# Patient Record
Sex: FEMALE | Race: WHITE | HISPANIC OR LATINO
[De-identification: names, ages, dates, MRNs, and addresses within clinical notes are randomized per-mention and may not be internally consistent; named-entity substitution may affect disease eponyms.]

---

## 2017-09-07 PROBLEM — Z00.00 ENCOUNTER FOR PREVENTIVE HEALTH EXAMINATION: Noted: 2017-09-07

## 2018-02-16 ENCOUNTER — RESULT REVIEW (OUTPATIENT)
Age: 38
End: 2018-02-16

## 2018-02-21 ENCOUNTER — OUTPATIENT (OUTPATIENT)
Dept: OUTPATIENT SERVICES | Facility: HOSPITAL | Age: 38
LOS: 1 days | End: 2018-02-21
Payer: COMMERCIAL

## 2018-02-21 ENCOUNTER — APPOINTMENT (OUTPATIENT)
Dept: MAMMOGRAPHY | Facility: CLINIC | Age: 38
End: 2018-02-21
Payer: COMMERCIAL

## 2018-02-21 DIAGNOSIS — Z00.8 ENCOUNTER FOR OTHER GENERAL EXAMINATION: ICD-10-CM

## 2018-02-21 PROBLEM — Z00.00 ENCOUNTER FOR PREVENTIVE HEALTH EXAMINATION: Status: ACTIVE | Noted: 2018-02-21

## 2018-02-21 PROCEDURE — 77063 BREAST TOMOSYNTHESIS BI: CPT | Mod: 26

## 2018-02-21 PROCEDURE — 77067 SCR MAMMO BI INCL CAD: CPT | Mod: 26

## 2018-02-21 PROCEDURE — 77067 SCR MAMMO BI INCL CAD: CPT

## 2018-02-21 PROCEDURE — 77063 BREAST TOMOSYNTHESIS BI: CPT

## 2019-10-20 ENCOUNTER — EMERGENCY (EMERGENCY)
Facility: HOSPITAL | Age: 39
LOS: 0 days | Discharge: HOME | End: 2019-10-21
Attending: EMERGENCY MEDICINE
Payer: COMMERCIAL

## 2019-10-20 VITALS
TEMPERATURE: 98 F | HEART RATE: 87 BPM | OXYGEN SATURATION: 100 % | RESPIRATION RATE: 18 BRPM | SYSTOLIC BLOOD PRESSURE: 111 MMHG | DIASTOLIC BLOOD PRESSURE: 71 MMHG

## 2019-10-20 VITALS — HEIGHT: 63 IN | WEIGHT: 199.96 LBS

## 2019-10-20 DIAGNOSIS — Z88.1 ALLERGY STATUS TO OTHER ANTIBIOTIC AGENTS STATUS: ICD-10-CM

## 2019-10-20 DIAGNOSIS — R07.0 PAIN IN THROAT: ICD-10-CM

## 2019-10-20 DIAGNOSIS — Z86.32 PERSONAL HISTORY OF GESTATIONAL DIABETES: ICD-10-CM

## 2019-10-20 PROCEDURE — 99282 EMERGENCY DEPT VISIT SF MDM: CPT

## 2019-10-20 PROCEDURE — 71045 X-RAY EXAM CHEST 1 VIEW: CPT

## 2019-10-20 PROCEDURE — 70360 X-RAY EXAM OF NECK: CPT

## 2019-10-20 PROCEDURE — 99284 EMERGENCY DEPT VISIT MOD MDM: CPT | Mod: 25

## 2019-10-20 NOTE — ED ADULT TRIAGE NOTE - CHIEF COMPLAINT QUOTE
Pt complains of eating fish this afternoon and feels as if she has a fish bone in her throat. Pt in no acute distress.

## 2019-10-21 PROCEDURE — 71045 X-RAY EXAM CHEST 1 VIEW: CPT | Mod: 26

## 2019-10-21 PROCEDURE — 70360 X-RAY EXAM OF NECK: CPT | Mod: 26

## 2019-10-21 NOTE — ED ADULT NURSE NOTE - NSIMPLEMENTINTERV_GEN_ALL_ED
Implemented All Universal Safety Interventions:  [place] to call system. Call bell, personal items and telephone within reach. Instruct patient to call for assistance. Room bathroom lighting operational. Non-slip footwear when patient is off stretcher. Physically safe environment: no spills, clutter or unnecessary equipment. Stretcher in lowest position, wheels locked, appropriate side rails in place.

## 2019-10-21 NOTE — ED PROVIDER NOTE - OBJECTIVE STATEMENT
39 year old female with PMH of gestational diabetes, lipoma who presents with cc of feeling of pain, and sensation of foreign body in the throat. no cp, sob or palpitation. no abdominal pain. No vomiting or nausea. No difficulty breathing. No sob. No visual or focal neurological complaints. No melena or hematochezia.

## 2019-10-21 NOTE — ED PROVIDER NOTE - PATIENT PORTAL LINK FT
You can access the FollowMyHealth Patient Portal offered by [facility] by registering at the following website: [email or web address]. By joining [employer]’s FollowMyHealth portal, you will also be able to view your health information using other applications (apps) compatible with our system.

## 2019-10-21 NOTE — ED PROVIDER NOTE - CLINICAL SUMMARY MEDICAL DECISION MAKING FREE TEXT BOX
No difficulty holding secretions, able to eat and drink in the ED, no respiratory distress, sensation of pain, or foreign body in the throat. X-ray shows no evidence of FB on my evaluation. Patient instructed to follow up with GI outpatient and to return to us immediately if any health concerns.

## 2019-10-21 NOTE — ED ADULT NURSE NOTE - OBJECTIVE STATEMENT
pt c/o fish bone stuck in throat during lunch around 2pm. pt denies SOB, difficulty breathing. pt able to control secretions. pt able to eat dinner & drink this evening but states it still feels like the bone is there.

## 2019-10-21 NOTE — ED PROVIDER NOTE - EYES, MLM
Clear bilaterally, pupils equal, round and reactive to light. EOMI. Visual fields intact x 4 fields.

## 2020-03-31 ENCOUNTER — INPATIENT (INPATIENT)
Facility: HOSPITAL | Age: 40
LOS: 4 days | Discharge: HOME | DRG: 133 | End: 2020-04-05
Attending: FAMILY MEDICINE | Admitting: HOSPITALIST
Payer: COMMERCIAL

## 2020-03-31 VITALS — HEIGHT: 62 IN | WEIGHT: 199.96 LBS | OXYGEN SATURATION: 83 % | HEART RATE: 105 BPM

## 2020-03-31 DIAGNOSIS — J96.01 ACUTE RESPIRATORY FAILURE WITH HYPOXIA: ICD-10-CM

## 2020-03-31 DIAGNOSIS — E11.9 TYPE 2 DIABETES MELLITUS WITHOUT COMPLICATIONS: ICD-10-CM

## 2020-03-31 DIAGNOSIS — J18.9 PNEUMONIA, UNSPECIFIED ORGANISM: ICD-10-CM

## 2020-03-31 DIAGNOSIS — U07.1 COVID-19: ICD-10-CM

## 2020-03-31 DIAGNOSIS — Z98.891 HISTORY OF UTERINE SCAR FROM PREVIOUS SURGERY: Chronic | ICD-10-CM

## 2020-03-31 DIAGNOSIS — Z91.19 PATIENT'S NONCOMPLIANCE WITH OTHER MEDICAL TREATMENT AND REGIMEN: ICD-10-CM

## 2020-03-31 DIAGNOSIS — J12.89 OTHER VIRAL PNEUMONIA: ICD-10-CM

## 2020-03-31 DIAGNOSIS — E11.65 TYPE 2 DIABETES MELLITUS WITH HYPERGLYCEMIA: ICD-10-CM

## 2020-03-31 LAB
ADD ON TEST-SPECIMEN IN LAB: SIGNIFICANT CHANGE UP
ALBUMIN SERPL ELPH-MCNC: 2.9 G/DL — LOW (ref 3.3–5)
ALP SERPL-CCNC: 92 U/L — SIGNIFICANT CHANGE UP (ref 40–120)
ALT FLD-CCNC: 27 U/L — SIGNIFICANT CHANGE UP (ref 12–78)
ANION GAP SERPL CALC-SCNC: 9 MMOL/L — SIGNIFICANT CHANGE UP (ref 5–17)
AST SERPL-CCNC: 19 U/L — SIGNIFICANT CHANGE UP (ref 15–37)
BASOPHILS # BLD AUTO: 0 K/UL — SIGNIFICANT CHANGE UP (ref 0–0.2)
BASOPHILS NFR BLD AUTO: 0 % — SIGNIFICANT CHANGE UP (ref 0–2)
BILIRUB SERPL-MCNC: 0.4 MG/DL — SIGNIFICANT CHANGE UP (ref 0.2–1.2)
BUN SERPL-MCNC: 6 MG/DL — LOW (ref 7–23)
CALCIUM SERPL-MCNC: 8.9 MG/DL — SIGNIFICANT CHANGE UP (ref 8.5–10.1)
CHLORIDE SERPL-SCNC: 103 MMOL/L — SIGNIFICANT CHANGE UP (ref 96–108)
CO2 SERPL-SCNC: 25 MMOL/L — SIGNIFICANT CHANGE UP (ref 22–31)
CREAT SERPL-MCNC: 0.62 MG/DL — SIGNIFICANT CHANGE UP (ref 0.5–1.3)
EOSINOPHIL # BLD AUTO: 0 K/UL — SIGNIFICANT CHANGE UP (ref 0–0.5)
EOSINOPHIL NFR BLD AUTO: 0 % — SIGNIFICANT CHANGE UP (ref 0–6)
GLUCOSE SERPL-MCNC: 303 MG/DL — HIGH (ref 70–99)
HCG SERPL-ACNC: <1 MIU/ML — SIGNIFICANT CHANGE UP
HCT VFR BLD CALC: 40.1 % — SIGNIFICANT CHANGE UP (ref 34.5–45)
HGB BLD-MCNC: 13.3 G/DL — SIGNIFICANT CHANGE UP (ref 11.5–15.5)
LACTATE SERPL-SCNC: 1.4 MMOL/L — SIGNIFICANT CHANGE UP (ref 0.7–2)
LYMPHOCYTES # BLD AUTO: 1.43 K/UL — SIGNIFICANT CHANGE UP (ref 1–3.3)
LYMPHOCYTES # BLD AUTO: 14 % — SIGNIFICANT CHANGE UP (ref 13–44)
MCHC RBC-ENTMCNC: 27.7 PG — SIGNIFICANT CHANGE UP (ref 27–34)
MCHC RBC-ENTMCNC: 33.2 GM/DL — SIGNIFICANT CHANGE UP (ref 32–36)
MCV RBC AUTO: 83.5 FL — SIGNIFICANT CHANGE UP (ref 80–100)
MONOCYTES # BLD AUTO: 0.92 K/UL — HIGH (ref 0–0.9)
MONOCYTES NFR BLD AUTO: 9 % — SIGNIFICANT CHANGE UP (ref 2–14)
NEUTROPHILS # BLD AUTO: 7.56 K/UL — HIGH (ref 1.8–7.4)
NEUTROPHILS NFR BLD AUTO: 74 % — SIGNIFICANT CHANGE UP (ref 43–77)
NRBC # BLD: SIGNIFICANT CHANGE UP /100 WBCS (ref 0–0)
PLATELET # BLD AUTO: 345 K/UL — SIGNIFICANT CHANGE UP (ref 150–400)
POTASSIUM SERPL-MCNC: 3.6 MMOL/L — SIGNIFICANT CHANGE UP (ref 3.5–5.3)
POTASSIUM SERPL-SCNC: 3.6 MMOL/L — SIGNIFICANT CHANGE UP (ref 3.5–5.3)
PROT SERPL-MCNC: 7.9 GM/DL — SIGNIFICANT CHANGE UP (ref 6–8.3)
RBC # BLD: 4.8 M/UL — SIGNIFICANT CHANGE UP (ref 3.8–5.2)
RBC # FLD: 12.3 % — SIGNIFICANT CHANGE UP (ref 10.3–14.5)
SODIUM SERPL-SCNC: 137 MMOL/L — SIGNIFICANT CHANGE UP (ref 135–145)
WBC # BLD: 10.22 K/UL — SIGNIFICANT CHANGE UP (ref 3.8–10.5)
WBC # FLD AUTO: 10.22 K/UL — SIGNIFICANT CHANGE UP (ref 3.8–10.5)

## 2020-03-31 PROCEDURE — 85027 COMPLETE CBC AUTOMATED: CPT

## 2020-03-31 PROCEDURE — 93005 ELECTROCARDIOGRAM TRACING: CPT

## 2020-03-31 PROCEDURE — 36415 COLL VENOUS BLD VENIPUNCTURE: CPT

## 2020-03-31 PROCEDURE — 80053 COMPREHEN METABOLIC PANEL: CPT

## 2020-03-31 PROCEDURE — 83615 LACTATE (LD) (LDH) ENZYME: CPT

## 2020-03-31 PROCEDURE — 83036 HEMOGLOBIN GLYCOSYLATED A1C: CPT

## 2020-03-31 PROCEDURE — 93010 ELECTROCARDIOGRAM REPORT: CPT

## 2020-03-31 PROCEDURE — 80048 BASIC METABOLIC PNL TOTAL CA: CPT

## 2020-03-31 PROCEDURE — 71045 X-RAY EXAM CHEST 1 VIEW: CPT | Mod: 26

## 2020-03-31 PROCEDURE — 82962 GLUCOSE BLOOD TEST: CPT

## 2020-03-31 PROCEDURE — 85025 COMPLETE CBC W/AUTO DIFF WBC: CPT

## 2020-03-31 PROCEDURE — 86140 C-REACTIVE PROTEIN: CPT

## 2020-03-31 PROCEDURE — 82803 BLOOD GASES ANY COMBINATION: CPT

## 2020-03-31 PROCEDURE — 80061 LIPID PANEL: CPT

## 2020-03-31 PROCEDURE — 99223 1ST HOSP IP/OBS HIGH 75: CPT

## 2020-03-31 PROCEDURE — 85379 FIBRIN DEGRADATION QUANT: CPT

## 2020-03-31 PROCEDURE — 82728 ASSAY OF FERRITIN: CPT

## 2020-03-31 PROCEDURE — 36600 WITHDRAWAL OF ARTERIAL BLOOD: CPT

## 2020-03-31 PROCEDURE — 84145 PROCALCITONIN (PCT): CPT

## 2020-03-31 RX ORDER — ACETAMINOPHEN 500 MG
1000 TABLET ORAL ONCE
Refills: 0 | Status: COMPLETED | OUTPATIENT
Start: 2020-03-31 | End: 2020-03-31

## 2020-03-31 RX ORDER — INSULIN LISPRO 100/ML
VIAL (ML) SUBCUTANEOUS
Refills: 0 | Status: DISCONTINUED | OUTPATIENT
Start: 2020-03-31 | End: 2020-03-31

## 2020-03-31 RX ORDER — DEXTROSE 50 % IN WATER 50 %
15 SYRINGE (ML) INTRAVENOUS ONCE
Refills: 0 | Status: DISCONTINUED | OUTPATIENT
Start: 2020-03-31 | End: 2020-04-01

## 2020-03-31 RX ORDER — CEFTRIAXONE 500 MG/1
1000 INJECTION, POWDER, FOR SOLUTION INTRAMUSCULAR; INTRAVENOUS EVERY 24 HOURS
Refills: 0 | Status: DISCONTINUED | OUTPATIENT
Start: 2020-03-31 | End: 2020-04-05

## 2020-03-31 RX ORDER — DEXTROSE 50 % IN WATER 50 %
12.5 SYRINGE (ML) INTRAVENOUS ONCE
Refills: 0 | Status: DISCONTINUED | OUTPATIENT
Start: 2020-03-31 | End: 2020-04-03

## 2020-03-31 RX ORDER — ACETAMINOPHEN 500 MG
650 TABLET ORAL EVERY 6 HOURS
Refills: 0 | Status: DISCONTINUED | OUTPATIENT
Start: 2020-03-31 | End: 2020-04-05

## 2020-03-31 RX ORDER — GLUCAGON INJECTION, SOLUTION 0.5 MG/.1ML
1 INJECTION, SOLUTION SUBCUTANEOUS ONCE
Refills: 0 | Status: DISCONTINUED | OUTPATIENT
Start: 2020-03-31 | End: 2020-04-03

## 2020-03-31 RX ORDER — DEXTROSE 50 % IN WATER 50 %
25 SYRINGE (ML) INTRAVENOUS ONCE
Refills: 0 | Status: DISCONTINUED | OUTPATIENT
Start: 2020-03-31 | End: 2020-04-01

## 2020-03-31 RX ORDER — INSULIN LISPRO 100/ML
VIAL (ML) SUBCUTANEOUS AT BEDTIME
Refills: 0 | Status: DISCONTINUED | OUTPATIENT
Start: 2020-03-31 | End: 2020-03-31

## 2020-03-31 RX ORDER — AZITHROMYCIN 500 MG/1
500 TABLET, FILM COATED ORAL ONCE
Refills: 0 | Status: COMPLETED | OUTPATIENT
Start: 2020-03-31 | End: 2020-03-31

## 2020-03-31 RX ORDER — HYDROXYCHLOROQUINE SULFATE 200 MG
400 TABLET ORAL EVERY 12 HOURS
Refills: 0 | Status: COMPLETED | OUTPATIENT
Start: 2020-03-31 | End: 2020-04-01

## 2020-03-31 RX ORDER — HYDROXYCHLOROQUINE SULFATE 200 MG
TABLET ORAL
Refills: 0 | Status: COMPLETED | OUTPATIENT
Start: 2020-03-31 | End: 2020-04-05

## 2020-03-31 RX ORDER — INSULIN GLARGINE 100 [IU]/ML
10 INJECTION, SOLUTION SUBCUTANEOUS EVERY MORNING
Refills: 0 | Status: DISCONTINUED | OUTPATIENT
Start: 2020-03-31 | End: 2020-04-01

## 2020-03-31 RX ORDER — ENOXAPARIN SODIUM 100 MG/ML
40 INJECTION SUBCUTANEOUS DAILY
Refills: 0 | Status: DISCONTINUED | OUTPATIENT
Start: 2020-03-31 | End: 2020-04-05

## 2020-03-31 RX ORDER — ALBUTEROL 90 UG/1
2 AEROSOL, METERED ORAL EVERY 6 HOURS
Refills: 0 | Status: DISCONTINUED | OUTPATIENT
Start: 2020-03-31 | End: 2020-04-05

## 2020-03-31 RX ORDER — ALBUTEROL 90 UG/1
2 AEROSOL, METERED ORAL ONCE
Refills: 0 | Status: COMPLETED | OUTPATIENT
Start: 2020-03-31 | End: 2020-03-31

## 2020-03-31 RX ORDER — CEFTRIAXONE 500 MG/1
1 INJECTION, POWDER, FOR SOLUTION INTRAMUSCULAR; INTRAVENOUS EVERY 24 HOURS
Refills: 0 | Status: DISCONTINUED | OUTPATIENT
Start: 2020-03-31 | End: 2020-03-31

## 2020-03-31 RX ORDER — HYDROXYCHLOROQUINE SULFATE 200 MG
200 TABLET ORAL EVERY 12 HOURS
Refills: 0 | Status: COMPLETED | OUTPATIENT
Start: 2020-04-01 | End: 2020-04-05

## 2020-03-31 RX ORDER — SODIUM CHLORIDE 9 MG/ML
1000 INJECTION, SOLUTION INTRAVENOUS
Refills: 0 | Status: DISCONTINUED | OUTPATIENT
Start: 2020-03-31 | End: 2020-04-01

## 2020-03-31 RX ORDER — ONDANSETRON 8 MG/1
4 TABLET, FILM COATED ORAL EVERY 6 HOURS
Refills: 0 | Status: DISCONTINUED | OUTPATIENT
Start: 2020-03-31 | End: 2020-04-05

## 2020-03-31 RX ORDER — SODIUM CHLORIDE 9 MG/ML
1000 INJECTION INTRAMUSCULAR; INTRAVENOUS; SUBCUTANEOUS ONCE
Refills: 0 | Status: COMPLETED | OUTPATIENT
Start: 2020-03-31 | End: 2020-03-31

## 2020-03-31 RX ADMIN — SODIUM CHLORIDE 1000 MILLILITER(S): 9 INJECTION INTRAMUSCULAR; INTRAVENOUS; SUBCUTANEOUS at 02:51

## 2020-03-31 RX ADMIN — ENOXAPARIN SODIUM 40 MILLIGRAM(S): 100 INJECTION SUBCUTANEOUS at 18:31

## 2020-03-31 RX ADMIN — AZITHROMYCIN 500 MILLIGRAM(S): 500 TABLET, FILM COATED ORAL at 03:49

## 2020-03-31 RX ADMIN — ALBUTEROL 2 PUFF(S): 90 AEROSOL, METERED ORAL at 01:59

## 2020-03-31 RX ADMIN — Medication 45 MILLIGRAM(S): at 18:31

## 2020-03-31 RX ADMIN — Medication 1200 MILLIGRAM(S): at 02:21

## 2020-03-31 RX ADMIN — Medication 1000 MILLIGRAM(S): at 01:40

## 2020-03-31 RX ADMIN — Medication 400 MILLIGRAM(S): at 18:32

## 2020-03-31 RX ADMIN — AZITHROMYCIN 255 MILLIGRAM(S): 500 TABLET, FILM COATED ORAL at 02:49

## 2020-03-31 RX ADMIN — SODIUM CHLORIDE 1000 MILLILITER(S): 9 INJECTION INTRAMUSCULAR; INTRAVENOUS; SUBCUTANEOUS at 01:40

## 2020-03-31 NOTE — H&P ADULT - PROBLEM SELECTOR PLAN 2
Hold oral hypoglycemic agent  Sliding scale  Hgb A1C in AM  Lipid profile in AM - Hold oral hypoglycemic agent  - Sliding scale  - Hgb A1C in AM  Lipid profile in AM

## 2020-03-31 NOTE — ED ADULT NURSE NOTE - CHPI ED NUR SYMPTOMS NEG
no wheezing/no chest pain/no chills/no cough/no diaphoresis/no edema/no body aches/no headache/no hemoptysis

## 2020-03-31 NOTE — ED ADULT NURSE REASSESSMENT NOTE - NS ED NURSE REASSESS COMMENT FT1
Patient received from previous nurse. Pt is A&Ox4, VSS on nonbreather. Pt is resting comfortably in their bed at this time, denies any pain or discomfort at this time. Safety and comfort measures in place. Hourly rounding will be done on my time. Will continue to monitor.

## 2020-03-31 NOTE — H&P ADULT - HISTORY OF PRESENT ILLNESS
41 yo F with a hx of Type 2 DM  on Metformin presents with 2 weeks of generalized fatigue, body aches, 4 days of dry cough, 2 days of shortness of breath and fever.  Patient came to ED for difficulty breathing after coughing and while trying to go to the bathroom in the middle of the night.  Patient took Tylenol with minimal relief  In ED on O2 NRM;  remains SOB with minimal activities. Received 1 dose of Azithromycin IV 41 yo F with a hx of Type 2 DM  on Metformin presents with 2 weeks of generalized fatigue, body aches, 4 days of dry cough, 2 days of shortness of breath and fever.  Patient came to ED for difficulty breathing after coughing and while trying to go to the bathroom in the middle of the night.  Patient took Tylenol with minimal relief  In ED on O2 NRM; remains SOB with minimal activities. Received 1 dose of Azithromycin IV Patient is a 41 y/o/f w/ a hx of Type 2 DM admitted in the hospital with cc of 2 weeks of generalized fatigue, body aches, 4 days of dry cough, 2 days of shortness of breath and fever.  Patient came to ED for difficulty breathing after coughing and while trying to go to the bathroom in the middle of the night.  Patient took Tylenol with minimal relief    In ED on O2 NRM; remains SOB with minimal activities. Received 1 dose of Azithromycin IV    Started on IV solumedrol / plaquinil /  Patient currently on NRB with sats

## 2020-03-31 NOTE — ED ADULT NURSE NOTE - OBJECTIVE STATEMENT
Pt comes to the ED complaining of fever and shortness of breath x 1 week. Pt states that tonight her breathing became very fast and she felt like she could not breathe.

## 2020-03-31 NOTE — ED PROVIDER NOTE - CARE PLAN
Principal Discharge DX:	Pneumonia, unspecified organism  Secondary Diagnosis:	Dyspnea, unspecified type  Secondary Diagnosis:	Hypoxia

## 2020-03-31 NOTE — H&P ADULT - PROBLEM SELECTOR PLAN 1
R/O COVID  O2 100% NRM  Admit to tele  EKG  Plaquenil taper  Solumedrol 45 mg IV BID - R/O COVID  - O2 100% NRM  - continues O2 monitoring  - EKG  - initiated Plaquenil   - Solumedrol 45 mg IV BID 1/3 days  - closely monitor patient's respiratory status

## 2020-03-31 NOTE — ED ADULT NURSE REASSESSMENT NOTE - NS ED NURSE REASSESS COMMENT FT1
Pt A&O x 4, recvd pt from night RN in bed with NRB in place. Pt given breakfast and denies pain, or SOB at this time. will monitor.

## 2020-03-31 NOTE — H&P ADULT - NSHPPHYSICALEXAM_GEN_ALL_CORE
PHYSICAL EXAM:    T(C): 36.7 (31 Mar 2020 08:16), Max: 37.4 (31 Mar 2020 07:06)  T(F): 98.1 (31 Mar 2020 08:16), Max: 99.3 (31 Mar 2020 07:06)  HR: 82 (31 Mar 2020 10:59) (68 - 105)  BP: 117/79 (31 Mar 2020 10:59) (105/68 - 117/79)  BP(mean): 81 (31 Mar 2020 08:16) (81 - 81)  RR: 25 (31 Mar 2020 10:59) (25 - 33)  SpO2: 97% (31 Mar 2020 10:59) (83% - 99%)    Constitutional: NAD, well-groomed, well-developed  Neuro: Alert and oriented x 3 Gait steady Speech clear No focal deficits  Neck: No JVD No bruit  Respiratory: CTAB; decreased breath sounds bilaterally  Cardiovascular: S1 and S2, RRR,   Gastrointestinal: BS+, soft, NT/ND  Extremities: No clubbing cyanosis or edema No varicosities  Vascular: 2+ peripheral pulses  Psychiatric: Normal mood, normal affect  Musculoskeletal: 5/5 strength b/l upper and lower extremities

## 2020-03-31 NOTE — H&P ADULT - NSHPLABSRESULTS_GEN_ALL_CORE
13.3   10.22 )-----------( 345      ( 31 Mar 2020 01:25 )             40.1       03-31 @ 01:25    137 | 103 | 6  /8.9 | -- | --  _______________________/  3.6 | 25 | 0.62 \303                          \      < from: Xray Chest 1 View-PORTABLE IMMEDIATE (03.31.20 @ 02:28) >    PROCEDURE DATE:  03/31/2020          INTERPRETATION:  CLINICAL INDICATION: Shortness of breath, fever, dry cough.    TECHNIQUE: AP view of the chest.    COMPARISON: 10/21/2019.     FINDINGS: Low lung volume. Linear/patchy opacities in the left > right lung base. Possible blunting of the left costophrenic angle. No pneumothorax. No pulmonary vascular congestion. The cardiomediastinal silhouette is difficult to assess. Degenerative changes in the spine.     IMPRESSION:    Linear/patchy opacities in the left > right lung base, which may be due to atelectasis although pneumonia (bacterial or viral, i.e., COVID-19) cannot be excluded. Recommend correlation with history and clinical testing for further evaluation.      ADRIANNA WOOD   This document has been electronically signed. Mar 31 2020  2:58AM        < end of copied text >    3/31/2020 EKG NSR- CBC Full  -  ( 31 Mar 2020 01:25 )  WBC Count : 10.22 K/uL  RBC Count : 4.80 M/uL  Hemoglobin : 13.3 g/dL  Hematocrit : 40.1 %  Platelet Count - Automated : 345 K/uL  Mean Cell Volume : 83.5 fl  Mean Cell Hemoglobin : 27.7 pg  Mean Cell Hemoglobin Concentration : 33.2 gm/dL  Auto Neutrophil # : 7.56 K/uL  Auto Lymphocyte # : 1.43 K/uL  Auto Monocyte # : 0.92 K/uL  Auto Eosinophil # : 0.00 K/uL  Auto Basophil # : 0.00 K/uL  Auto Neutrophil % : 74.0 %  Auto Lymphocyte % : 14.0 %  Auto Monocyte % : 9.0 %  Auto Eosinophil % : 0.0 %  Auto Basophil % : 0.0 %        03-31    137  |  103  |  6<L>  ----------------------------<  303<H>  3.6   |  25  |  0.62    Ca    8.9      31 Mar 2020 01:25    TPro  7.9  /  Alb  2.9<L>  /  TBili  0.4  /  DBili  x   /  AST  19  /  ALT  27  /  AlkPhos  92  03-31        < from: Xray Chest 1 View-PORTABLE IMMEDIATE (03.31.20 @ 02:28) >    PROCEDURE DATE:  03/31/2020          INTERPRETATION:  CLINICAL INDICATION: Shortness of breath, fever, dry cough.    TECHNIQUE: AP view of the chest.    COMPARISON: 10/21/2019.     FINDINGS: Low lung volume. Linear/patchy opacities in the left > right lung base. Possible blunting of the left costophrenic angle. No pneumothorax. No pulmonary vascular congestion. The cardiomediastinal silhouette is difficult to assess. Degenerative changes in the spine.     IMPRESSION:    Linear/patchy opacities in the left > right lung base, which may be due to atelectasis although pneumonia (bacterial or viral, i.e., COVID-19) cannot be excluded. Recommend correlation with history and clinical testing for further evaluation.      ADRIANNA WOOD   This document has been electronically signed. Mar 31 2020  2:58AM        < end of copied text >    3/31/2020 EKG NSR-

## 2020-03-31 NOTE — H&P ADULT - NSHPREVIEWOFSYSTEMS_GEN_ALL_CORE
REVIEW OF SYSTEMS:    CONSTITUTIONAL: + generalized fatigue; body aches   EYES/ENT: No visual changes;  No vertigo or throat pain   NECK: No pain or stiffness  RESPIRATORY: + cough and SOB  CARDIOVASCULAR: No chest pain or palpitations  GASTROINTESTINAL: No abdominal or epigastric pain. No nausea, vomiting, or hematemesis; No diarrhea or constipation. No melena or hematochezia.  GENITOURINARY: No dysuria, frequency or hematuria. LMP: last week  NEUROLOGICAL: No numbness or weakness  SKIN: No itching, burning, rashes, or lesions   All other review of systems is negative unless indicated above. REVIEW OF SYSTEMS:  CONSTITUTIONAL: + generalized fatigue; body aches   EYES/ENT: No visual changes;  No vertigo or throat pain   NECK: No pain or stiffness  RESPIRATORY: + cough and SOB  CARDIOVASCULAR: No chest pain or palpitations  GASTROINTESTINAL: No abdominal or epigastric pain. No nausea, vomiting, or hematemesis; No diarrhea or constipation. No melena or hematochezia.  GENITOURINARY: No dysuria, frequency or hematuria. LMP: last week  NEUROLOGICAL: No numbness or weakness  SKIN: No itching, burning, rashes, or lesions   All other review of systems is negative unless indicated above.

## 2020-03-31 NOTE — ED ADULT NURSE NOTE - ISOLATION TYPE:
Airborne+Contact precautions/Droplet+Contact precautions/Contact precautions.../Droplet precautions...

## 2020-03-31 NOTE — ED ADULT NURSE NOTE - NSIMPLEMENTINTERV_GEN_ALL_ED
Implemented All Universal Safety Interventions:  [place] to call system. Call bell, personal items and telephone within reach. Instruct patient to call for assistance. Room bathroom lighting operational. Non-slip footwear when patient is off stretcher. Physically safe environment: no spills, clutter or unnecessary equipment. Stretcher in lowest position, wheels locked, appropriate side rails in place.

## 2020-03-31 NOTE — H&P ADULT - ASSESSMENT
41 yo F with a hx of Type 2 DM  on Metformin presents with 2 weeks of generalized fatigue, body aches, 4 days of dry cough, 2 days of shortness of breath and fever.  Patient came to ED for difficulty breathing after coughing and while trying to go to the bathroom in the middle of the night.  Patient took Tylenol with minimal relief  In ED on O2 NRM; remains SOB with minimal activities. Received 1 dose of Azithromycin IV     IMPROVE VTE Individual Risk Assessment          RISK                                                          Points  [  ] Previous VTE                                                3  [  ] Thrombophilia                                             2  [  ] Lower limb paralysis                                   2        (unable to hold up >15 seconds)    [  ] Current Cancer                                             2         (within 6 months)  [ x ] Immobilization > 24 hrs                              1  [  ] ICU/CCU stay > 24 hours                             1  [  ] Age > 60                                                         1    IMPROVE VTE Score: 1

## 2020-03-31 NOTE — ED PROVIDER NOTE - CONSTITUTIONAL, MLM
normal... Well appearing, awake, alert, oriented to person, place, time/situation ; anxious; conversational dyspnea.

## 2020-03-31 NOTE — ED ADULT TRIAGE NOTE - ISOLATION TYPE:
Airborne+Contact precautions/Droplet+Contact precautions/Droplet precautions.../Contact precautions...

## 2020-03-31 NOTE — ED ADULT TRIAGE NOTE - CHIEF COMPLAINT QUOTE
Pt presents to er with complaints of SOB and fevers for past week, denies sick contacts, respirations labored with 83% pulse-ox RA at this time, hr-105

## 2020-03-31 NOTE — ED PROVIDER NOTE - OBJECTIVE STATEMENT
Pt. is a 41 yo F with a hx of Type 2 DM presents with 2 weeks of generalized fatigue, body aches, 4 days of dry cough, 2 days of shortness of breath and fever.  Patient came to ED for difficulty breathing after coughing and while trying to go to the bathroom in the middle of the night.  Patient took tylenol yesterday for fever with minimal relief.  No other meds taken prior to arrival.

## 2020-04-01 DIAGNOSIS — R79.89 OTHER SPECIFIED ABNORMAL FINDINGS OF BLOOD CHEMISTRY: ICD-10-CM

## 2020-04-01 LAB
ALBUMIN SERPL ELPH-MCNC: 2.4 G/DL — LOW (ref 3.3–5)
ALP SERPL-CCNC: 92 U/L — SIGNIFICANT CHANGE UP (ref 40–120)
ALT FLD-CCNC: 28 U/L — SIGNIFICANT CHANGE UP (ref 12–78)
ANION GAP SERPL CALC-SCNC: 11 MMOL/L — SIGNIFICANT CHANGE UP (ref 5–17)
AST SERPL-CCNC: 18 U/L — SIGNIFICANT CHANGE UP (ref 15–37)
BASE EXCESS BLDA CALC-SCNC: -3.7 MMOL/L — LOW (ref -2–2)
BASOPHILS # BLD AUTO: 0 K/UL — SIGNIFICANT CHANGE UP (ref 0–0.2)
BASOPHILS NFR BLD AUTO: 0 % — SIGNIFICANT CHANGE UP (ref 0–2)
BILIRUB SERPL-MCNC: 0.4 MG/DL — SIGNIFICANT CHANGE UP (ref 0.2–1.2)
BUN SERPL-MCNC: 12 MG/DL — SIGNIFICANT CHANGE UP (ref 7–23)
CALCIUM SERPL-MCNC: 8.6 MG/DL — SIGNIFICANT CHANGE UP (ref 8.5–10.1)
CHLORIDE SERPL-SCNC: 105 MMOL/L — SIGNIFICANT CHANGE UP (ref 96–108)
CHOLEST SERPL-MCNC: 116 MG/DL — SIGNIFICANT CHANGE UP (ref 10–199)
CO2 SERPL-SCNC: 21 MMOL/L — LOW (ref 22–31)
CREAT SERPL-MCNC: 0.49 MG/DL — LOW (ref 0.5–1.3)
D DIMER BLD IA.RAPID-MCNC: 9347 NG/ML DDU — HIGH
EOSINOPHIL # BLD AUTO: 0 K/UL — SIGNIFICANT CHANGE UP (ref 0–0.5)
EOSINOPHIL NFR BLD AUTO: 0 % — SIGNIFICANT CHANGE UP (ref 0–6)
FERRITIN SERPL-MCNC: 422 NG/ML — HIGH (ref 15–150)
GAS PNL BLDA: SIGNIFICANT CHANGE UP
GLUCOSE SERPL-MCNC: 314 MG/DL — HIGH (ref 70–99)
HBA1C BLD-MCNC: 10.5 % — HIGH (ref 4–5.6)
HCO3 BLDA-SCNC: 20 MMOL/L — LOW (ref 21–29)
HCT VFR BLD CALC: 40.5 % — SIGNIFICANT CHANGE UP (ref 34.5–45)
HDLC SERPL-MCNC: 31 MG/DL — LOW
HGB BLD-MCNC: 13.3 G/DL — SIGNIFICANT CHANGE UP (ref 11.5–15.5)
LDH SERPL L TO P-CCNC: 274 U/L — HIGH (ref 84–241)
LIPID PNL WITH DIRECT LDL SERPL: 59 MG/DL — SIGNIFICANT CHANGE UP
LYMPHOCYTES # BLD AUTO: 1.12 K/UL — SIGNIFICANT CHANGE UP (ref 1–3.3)
LYMPHOCYTES # BLD AUTO: 20 % — SIGNIFICANT CHANGE UP (ref 13–44)
MCHC RBC-ENTMCNC: 27.4 PG — SIGNIFICANT CHANGE UP (ref 27–34)
MCHC RBC-ENTMCNC: 32.8 GM/DL — SIGNIFICANT CHANGE UP (ref 32–36)
MCV RBC AUTO: 83.5 FL — SIGNIFICANT CHANGE UP (ref 80–100)
MONOCYTES # BLD AUTO: 0.11 K/UL — SIGNIFICANT CHANGE UP (ref 0–0.9)
MONOCYTES NFR BLD AUTO: 2 % — SIGNIFICANT CHANGE UP (ref 2–14)
NEUTROPHILS # BLD AUTO: 4.33 K/UL — SIGNIFICANT CHANGE UP (ref 1.8–7.4)
NEUTROPHILS NFR BLD AUTO: 77 % — SIGNIFICANT CHANGE UP (ref 43–77)
NRBC # BLD: SIGNIFICANT CHANGE UP /100 WBCS (ref 0–0)
PCO2 BLDA: 34 MMHG — SIGNIFICANT CHANGE UP (ref 32–46)
PH BLDA: 7.39 — SIGNIFICANT CHANGE UP (ref 7.35–7.45)
PLATELET # BLD AUTO: 364 K/UL — SIGNIFICANT CHANGE UP (ref 150–400)
PO2 BLDA: 92 MMHG — SIGNIFICANT CHANGE UP (ref 74–108)
POTASSIUM SERPL-MCNC: 3.9 MMOL/L — SIGNIFICANT CHANGE UP (ref 3.5–5.3)
POTASSIUM SERPL-SCNC: 3.9 MMOL/L — SIGNIFICANT CHANGE UP (ref 3.5–5.3)
PROCALCITONIN SERPL-MCNC: 1.08 NG/ML — HIGH (ref 0.02–0.1)
PROT SERPL-MCNC: 7.5 GM/DL — SIGNIFICANT CHANGE UP (ref 6–8.3)
RBC # BLD: 4.85 M/UL — SIGNIFICANT CHANGE UP (ref 3.8–5.2)
RBC # FLD: 12.2 % — SIGNIFICANT CHANGE UP (ref 10.3–14.5)
SAO2 % BLDA: 97 % — HIGH (ref 92–96)
SARS-COV-2 RNA SPEC QL NAA+PROBE: DETECTED
SODIUM SERPL-SCNC: 137 MMOL/L — SIGNIFICANT CHANGE UP (ref 135–145)
TOTAL CHOLESTEROL/HDL RATIO MEASUREMENT: 3.7 RATIO — SIGNIFICANT CHANGE UP (ref 3.3–7.1)
TRIGL SERPL-MCNC: 128 MG/DL — SIGNIFICANT CHANGE UP (ref 10–149)
WBC # BLD: 5.62 K/UL — SIGNIFICANT CHANGE UP (ref 3.8–10.5)
WBC # FLD AUTO: 5.62 K/UL — SIGNIFICANT CHANGE UP (ref 3.8–10.5)

## 2020-04-01 PROCEDURE — 99233 SBSQ HOSP IP/OBS HIGH 50: CPT

## 2020-04-01 RX ORDER — INSULIN LISPRO 100/ML
VIAL (ML) SUBCUTANEOUS
Refills: 0 | Status: DISCONTINUED | OUTPATIENT
Start: 2020-04-01 | End: 2020-04-01

## 2020-04-01 RX ORDER — INSULIN LISPRO 100/ML
VIAL (ML) SUBCUTANEOUS AT BEDTIME
Refills: 0 | Status: DISCONTINUED | OUTPATIENT
Start: 2020-04-01 | End: 2020-04-01

## 2020-04-01 RX ORDER — INSULIN GLARGINE 100 [IU]/ML
20 INJECTION, SOLUTION SUBCUTANEOUS EVERY MORNING
Refills: 0 | Status: DISCONTINUED | OUTPATIENT
Start: 2020-04-01 | End: 2020-04-02

## 2020-04-01 RX ADMIN — CEFTRIAXONE 1000 MILLIGRAM(S): 500 INJECTION, POWDER, FOR SOLUTION INTRAMUSCULAR; INTRAVENOUS at 01:11

## 2020-04-01 RX ADMIN — Medication 45 MILLIGRAM(S): at 06:13

## 2020-04-01 RX ADMIN — Medication 45 MILLIGRAM(S): at 17:56

## 2020-04-01 RX ADMIN — INSULIN GLARGINE 10 UNIT(S): 100 INJECTION, SOLUTION SUBCUTANEOUS at 09:25

## 2020-04-01 RX ADMIN — Medication 200 MILLIGRAM(S): at 12:45

## 2020-04-01 RX ADMIN — Medication 400 MILLIGRAM(S): at 01:11

## 2020-04-01 RX ADMIN — ENOXAPARIN SODIUM 40 MILLIGRAM(S): 100 INJECTION SUBCUTANEOUS at 12:45

## 2020-04-01 NOTE — PROGRESS NOTE ADULT - PROBLEM SELECTOR PLAN 1
Rocephin  Plaquenil  100% NRM  Continuous O2 sat monitoring - on IV Rocephin day # 1  - Plaquenil 1/5  - 100% NRM - sating in the 90s  - continues O2 monitoring

## 2020-04-01 NOTE — PROGRESS NOTE ADULT - SUBJECTIVE AND OBJECTIVE BOX
Medicine NP    Patient is a 40y old  Female who presents with a chief complaint of SOB and generalized fatigue (31 Mar 2020 12:01)      HPI:  Patient is a 39 y/o/f w/ a hx of Type 2 DM admitted in the hospital with cc of 2 weeks of generalized fatigue, body aches, 4 days of dry cough, 2 days of shortness of breath and fever.  Patient came to ED for difficulty breathing after coughing and while trying to go to the bathroom in the middle of the night.  Patient took Tylenol with minimal relief    In ED on O2 NRM; remains SOB with minimal activities. Received 1 dose of Azithromycin IV    Started on IV solumedrol / plaquinil /  Patient currently on NRB with sats (31 Mar 2020 12:01)    2020: On 100% NRM, O2 sat to low 80s off mask. Remains with SOB with moving in bed      PAST MEDICAL & SURGICAL HISTORY:  Gestational Diabetes Mellitus: 2004  H/O  section: 7 years  Lipoma: EXCISION -       MEDICATIONS  (STANDING):  cefTRIAXone Injectable. 1000 milliGRAM(s) IV Push every 24 hours  dextrose 5%. 1000 milliLiter(s) (50 mL/Hr) IV Continuous <Continuous>  dextrose 50% Injectable 12.5 Gram(s) IV Push once  dextrose 50% Injectable 25 Gram(s) IV Push once  dextrose 50% Injectable 25 Gram(s) IV Push once  enoxaparin Injectable 40 milliGRAM(s) SubCutaneous daily  hydroxychloroquine   Oral   hydroxychloroquine 200 milliGRAM(s) Oral every 12 hours  insulin glargine Injectable (LANTUS) 10 Unit(s) SubCutaneous every morning  methylPREDNISolone sodium succinate Injectable 45 milliGRAM(s) IV Push every 12 hours    MEDICATIONS  (PRN):  acetaminophen   Tablet .. 650 milliGRAM(s) Oral every 6 hours PRN Mild Pain (1 - 3)  acetaminophen   Tablet .. 650 milliGRAM(s) Oral every 6 hours PRN Temp greater or equal to 38C (100.4F)  ALBUTerol    90 MICROgram(s) HFA Inhaler 2 Puff(s) Inhalation every 6 hours PRN Shortness of Breath and/or Wheezing  dextrose 40% Gel 15 Gram(s) Oral once PRN Blood Glucose LESS THAN 70 milliGRAM(s)/deciliter  glucagon  Injectable 1 milliGRAM(s) IntraMuscular once PRN Glucose LESS THAN 70 milligrams/deciliter  guaiFENesin   Syrup  (Sugar-Free) 200 milliGRAM(s) Oral every 6 hours PRN Cough  ondansetron Injectable 4 milliGRAM(s) IV Push every 6 hours PRN Nausea and/or Vomiting      Allergies    amoxicillin (Unknown)          REVIEW OF SYSTEMS:  CONSTITUTIONAL: No fever. + fatigue  EYES: No eye pain, visual disturbances, or discharge  NECK: No pain or stiffness  RESPIRATORY: + dry cough and  shortness of breath  CARDIOVASCULAR: No chest pain, palpitations, dizziness, or leg swelling  GASTROINTESTINAL: No abdominal or epigastric pain. No nausea, vomiting, or hematemesis; No diarrhea or constipation. No melena or hematochezia.  NEUROLOGICAL: No headaches, memory loss, loss of strength, numbness, or tremors  SKIN: No itching, burning, rashes, or lesions   LYMPH NODES: No enlarged glands  ENDOCRINE: No heat or cold intolerance; No hair loss  MUSCULOSKELETAL: No joint pain or swelling; No muscle, back, or extremity pain  PSYCHIATRIC: No depression, anxiety, mood swings, or difficulty sleeping  HEME/LYMPH: No easy bruising, or bleeding gums  ALLERGY AND IMMUNOLOGIC: No hives or eczema    Vital Signs Last 24 Hrs  T(C): 36.3 (2020 06:12), Max: 37.3 (31 Mar 2020 21:44)  T(F): 97.4 (2020 06:12), Max: 99.1 (31 Mar 2020 21:44)  HR: 63 (2020 06:12) (63 - 82)  BP: 110/63 (2020 06:12) (110/63 - 129/76)  BP(mean): --  RR: 20 (2020 06:12) (20 - 25)  SpO2: 95% (2020 06:58) (94% - 100%)    PHYSICAL EXAM:  GENERAL: dyspneic with minimal activities    HEAD:  Atraumatic, Normocephalic  EYES: EOMI, conjunctiva and sclera clear  NECK: Supple, No JVD, Normal thyroid  NERVOUS SYSTEM:  Alert & Oriented X3, Good concentration; Motor Strength 5/5 B/L upper and lower extremities;   CHEST/LUNG: Clear to percussion bilaterally; No rales, rhonchi, wheezing, or rubs  HEART: Regular rate and rhythm; No murmurs, rubs, or gallops  ABDOMEN: Soft, Nontender, Nondistended; Bowel sounds present  EXTREMITIES:  2+ Peripheral Pulses, No clubbing, cyanosis, or edema  SKIN: No rashes or lesions    LABS:                        13.3   5.62  )-----------( 364      ( 2020 07:59 )             40.5         137  |  105  |  12  ----------------------------<  314<H>  3.9   |  21<L>  |  0.49<L>    Ca    8.6      2020 07:59    TPro  7.5  /  Alb  2.4<L>  /  TBili  0.4  /  DBili  x   /  AST  18  /  ALT  28  /  AlkPhos  92          CAPILLARY BLOOD GLUCOSE      POCT Blood Glucose.: 305 mg/dL (2020 08:33)  POCT Blood Glucose.: 284 mg/dL (31 Mar 2020 21:39)      RADIOLOGY & ADDITIONAL TESTS:    Assessment: 40 year old female with PMH of DM admitted with myalgia, cough and worsening SOB. COVID 19 results pending. Solumedrol and Plaquenil initiated. CXR revealed   Linear/patchy opacities in the left > right lung base .Started on Rocephin Medicine NP    Patient is a 40y old  Female who presents with a chief complaint of SOB and generalized fatigue (31 Mar 2020 12:01)      HPI:  Patient is a 41 y/o/f w/ a hx of Type 2 DM admitted in the hospital with cc of 2 weeks of generalized fatigue, body aches, 4 days of dry cough, 2 days of shortness of breath and fever.  Patient came to ED for difficulty breathing after coughing and while trying to go to the bathroom in the middle of the night.  Patient took Tylenol with minimal relief    In ED on O2 NRM; remains SOB with minimal activities. Received 1 dose of Azithromycin IV    Started on IV solumedrol / plaquinil /  Patient currently on NRB with sats (31 Mar 2020 12:01)    2020: On 100% NRM, O2 sat to low 80s off mask. Remains with SOB with moving in bed. Patient has very elevated d/dimer 9000s, will obtain CTA r/o clot as the infection itself is a pro-inflimatory state and can cause clots.       PAST MEDICAL & SURGICAL HISTORY:  Gestational Diabetes Mellitus: 2004  H/O  section: 7 years  Lipoma: EXCISION -       MEDICATIONS  (STANDING):  cefTRIAXone Injectable. 1000 milliGRAM(s) IV Push every 24 hours  dextrose 5%. 1000 milliLiter(s) (50 mL/Hr) IV Continuous <Continuous>  dextrose 50% Injectable 12.5 Gram(s) IV Push once  dextrose 50% Injectable 25 Gram(s) IV Push once  dextrose 50% Injectable 25 Gram(s) IV Push once  enoxaparin Injectable 40 milliGRAM(s) SubCutaneous daily  hydroxychloroquine   Oral   hydroxychloroquine 200 milliGRAM(s) Oral every 12 hours  insulin glargine Injectable (LANTUS) 10 Unit(s) SubCutaneous every morning  methylPREDNISolone sodium succinate Injectable 45 milliGRAM(s) IV Push every 12 hours    MEDICATIONS  (PRN):  acetaminophen   Tablet .. 650 milliGRAM(s) Oral every 6 hours PRN Mild Pain (1 - 3)  acetaminophen   Tablet .. 650 milliGRAM(s) Oral every 6 hours PRN Temp greater or equal to 38C (100.4F)  ALBUTerol    90 MICROgram(s) HFA Inhaler 2 Puff(s) Inhalation every 6 hours PRN Shortness of Breath and/or Wheezing  dextrose 40% Gel 15 Gram(s) Oral once PRN Blood Glucose LESS THAN 70 milliGRAM(s)/deciliter  glucagon  Injectable 1 milliGRAM(s) IntraMuscular once PRN Glucose LESS THAN 70 milligrams/deciliter  guaiFENesin   Syrup  (Sugar-Free) 200 milliGRAM(s) Oral every 6 hours PRN Cough  ondansetron Injectable 4 milliGRAM(s) IV Push every 6 hours PRN Nausea and/or Vomiting      Allergies    amoxicillin (Unknown)          REVIEW OF SYSTEMS:  CONSTITUTIONAL: No fever. + fatigue  EYES: No eye pain, visual disturbances, or discharge  NECK: No pain or stiffness  RESPIRATORY: + dry cough and  shortness of breath  CARDIOVASCULAR: No chest pain, palpitations, dizziness, or leg swelling  GASTROINTESTINAL: No abdominal or epigastric pain. No nausea, vomiting, or hematemesis; No diarrhea or constipation. No melena or hematochezia.  NEUROLOGICAL: No headaches, memory loss, loss of strength, numbness, or tremors  SKIN: No itching, burning, rashes, or lesions   LYMPH NODES: No enlarged glands  ENDOCRINE: No heat or cold intolerance; No hair loss  MUSCULOSKELETAL: No joint pain or swelling; No muscle, back, or extremity pain  PSYCHIATRIC: No depression, anxiety, mood swings, or difficulty sleeping  HEME/LYMPH: No easy bruising, or bleeding gums  ALLERGY AND IMMUNOLOGIC: No hives or eczema    Vital Signs Last 24 Hrs  T(C): 36.3 (2020 06:12), Max: 37.3 (31 Mar 2020 21:44)  T(F): 97.4 (2020 06:12), Max: 99.1 (31 Mar 2020 21:44)  HR: 63 (2020 06:12) (63 - 82)  BP: 110/63 (2020 06:12) (110/63 - 129/76)  BP(mean): --  RR: 20 (2020 06:12) (20 - 25)  SpO2: 95% (2020 06:58) (94% - 100%)    PHYSICAL EXAM:  GENERAL: dyspneic with minimal activities    HEAD:  Atraumatic, Normocephalic  EYES: EOMI, conjunctiva and sclera clear  NECK: Supple, No JVD, Normal thyroid  NERVOUS SYSTEM:  Alert & Oriented X3, Good concentration; Motor Strength 5/5 B/L upper and lower extremities;   CHEST/LUNG: Clear to percussion bilaterally; No rales, rhonchi, wheezing, or rubs  HEART: Regular rate and rhythm; No murmurs, rubs, or gallops  ABDOMEN: Soft, Nontender, Nondistended; Bowel sounds present  EXTREMITIES:  2+ Peripheral Pulses, No clubbing, cyanosis, or edema  SKIN: No rashes or lesions    LABS:                        13.3   5.62  )-----------( 364      ( 2020 07:59 )             40.5         137  |  105  |  12  ----------------------------<  314<H>  3.9   |  21<L>  |  0.49<L>    Ca    8.6      2020 07:59    TPro  7.5  /  Alb  2.4<L>  /  TBili  0.4  /  DBili  x   /  AST  18  /  ALT  28  /  AlkPhos  92  04-        CAPILLARY BLOOD GLUCOSE      POCT Blood Glucose.: 305 mg/dL (2020 08:33)  POCT Blood Glucose.: 284 mg/dL (31 Mar 2020 21:39)      RADIOLOGY & ADDITIONAL TESTS:    Assessment: 40 year old female with PMH of DM admitted with myalgia, cough and worsening SOB. COVID 19 results pending. Solumedrol and Plaquenil initiated. CXR revealed   Linear/patchy opacities in the left > right lung base .Started on Rocephin Medicine NP    Patient is a 40y old  Female who presents with a chief complaint of SOB and generalized fatigue (31 Mar 2020 12:01)      HPI:  Patient is a 41 y/o/f w/ a hx of Type 2 DM admitted in the hospital with cc of 2 weeks of generalized fatigue, body aches, 4 days of dry cough, 2 days of shortness of breath and fever.  Patient came to ED for difficulty breathing after coughing and while trying to go to the bathroom in the middle of the night.  Patient took Tylenol with minimal relief    In ED on O2 NRM; remains SOB with minimal activities. Received 1 dose of Azithromycin IV    Started on IV solumedrol / plaquinil /  Patient currently on NRB with sats (31 Mar 2020 12:01)    2020: On 100% NRM, O2 sat to low 80s off mask. Remains with SOB with moving in bed. elevated d-dimer can be COVID related. this being said, Not ordering CTA -  as patient's CXRAY findings are not less than what is expected given the degree of hypoxia! thanks for all of your input.    PAST MEDICAL & SURGICAL HISTORY:  Gestational Diabetes Mellitus: 2004  H/O  section: 7 years  Lipoma: EXCISION -       MEDICATIONS  (STANDING):  cefTRIAXone Injectable. 1000 milliGRAM(s) IV Push every 24 hours  dextrose 5%. 1000 milliLiter(s) (50 mL/Hr) IV Continuous <Continuous>  dextrose 50% Injectable 12.5 Gram(s) IV Push once  dextrose 50% Injectable 25 Gram(s) IV Push once  dextrose 50% Injectable 25 Gram(s) IV Push once  enoxaparin Injectable 40 milliGRAM(s) SubCutaneous daily  hydroxychloroquine   Oral   hydroxychloroquine 200 milliGRAM(s) Oral every 12 hours  insulin glargine Injectable (LANTUS) 10 Unit(s) SubCutaneous every morning  methylPREDNISolone sodium succinate Injectable 45 milliGRAM(s) IV Push every 12 hours    MEDICATIONS  (PRN):  acetaminophen   Tablet .. 650 milliGRAM(s) Oral every 6 hours PRN Mild Pain (1 - 3)  acetaminophen   Tablet .. 650 milliGRAM(s) Oral every 6 hours PRN Temp greater or equal to 38C (100.4F)  ALBUTerol    90 MICROgram(s) HFA Inhaler 2 Puff(s) Inhalation every 6 hours PRN Shortness of Breath and/or Wheezing  dextrose 40% Gel 15 Gram(s) Oral once PRN Blood Glucose LESS THAN 70 milliGRAM(s)/deciliter  glucagon  Injectable 1 milliGRAM(s) IntraMuscular once PRN Glucose LESS THAN 70 milligrams/deciliter  guaiFENesin   Syrup  (Sugar-Free) 200 milliGRAM(s) Oral every 6 hours PRN Cough  ondansetron Injectable 4 milliGRAM(s) IV Push every 6 hours PRN Nausea and/or Vomiting      Allergies    amoxicillin (Unknown)          REVIEW OF SYSTEMS:  CONSTITUTIONAL: No fever. + fatigue  EYES: No eye pain, visual disturbances, or discharge  NECK: No pain or stiffness  RESPIRATORY: + dry cough and  shortness of breath  CARDIOVASCULAR: No chest pain, palpitations, dizziness, or leg swelling  GASTROINTESTINAL: No abdominal or epigastric pain. No nausea, vomiting, or hematemesis; No diarrhea or constipation. No melena or hematochezia.  NEUROLOGICAL: No headaches, memory loss, loss of strength, numbness, or tremors  SKIN: No itching, burning, rashes, or lesions   LYMPH NODES: No enlarged glands  ENDOCRINE: No heat or cold intolerance; No hair loss  MUSCULOSKELETAL: No joint pain or swelling; No muscle, back, or extremity pain  PSYCHIATRIC: No depression, anxiety, mood swings, or difficulty sleeping  HEME/LYMPH: No easy bruising, or bleeding gums  ALLERGY AND IMMUNOLOGIC: No hives or eczema    Vital Signs Last 24 Hrs  T(C): 36.3 (2020 06:12), Max: 37.3 (31 Mar 2020 21:44)  T(F): 97.4 (2020 06:12), Max: 99.1 (31 Mar 2020 21:44)  HR: 63 (2020 06:12) (63 - 82)  BP: 110/63 (2020 06:12) (110/63 - 129/76)  BP(mean): --  RR: 20 (2020 06:12) (20 - 25)  SpO2: 95% (2020 06:58) (94% - 100%)    PHYSICAL EXAM:  GENERAL: dyspneic with minimal activities    HEAD:  Atraumatic, Normocephalic  EYES: EOMI, conjunctiva and sclera clear  NECK: Supple, No JVD, Normal thyroid  NERVOUS SYSTEM:  Alert & Oriented X3, Good concentration; Motor Strength 5/5 B/L upper and lower extremities;   CHEST/LUNG: Clear to percussion bilaterally; No rales, rhonchi, wheezing, or rubs  HEART: Regular rate and rhythm; No murmurs, rubs, or gallops  ABDOMEN: Soft, Nontender, Nondistended; Bowel sounds present  EXTREMITIES:  2+ Peripheral Pulses, No clubbing, cyanosis, or edema  SKIN: No rashes or lesions    LABS:                        13.3   5.62  )-----------( 364      ( 2020 07:59 )             40.5         137  |  105  |  12  ----------------------------<  314<H>  3.9   |  21<L>  |  0.49<L>    Ca    8.6      2020 07:59    TPro  7.5  /  Alb  2.4<L>  /  TBili  0.4  /  DBili  x   /  AST  18  /  ALT  28  /  AlkPhos  92  04-        CAPILLARY BLOOD GLUCOSE      POCT Blood Glucose.: 305 mg/dL (2020 08:33)  POCT Blood Glucose.: 284 mg/dL (31 Mar 2020 21:39)      RADIOLOGY & ADDITIONAL TESTS:    Assessment: 40 year old female with PMH of DM admitted with myalgia, cough and worsening SOB. COVID 19 results pending. Solumedrol and Plaquenil initiated. CXR revealed   Linear/patchy opacities in the left > right lung base .Started on Rocephin

## 2020-04-01 NOTE — PROGRESS NOTE ADULT - PROBLEM SELECTOR PLAN 3
- patient's d-dimer is too high  - r/o clot - patient's d-dimer is high  - Not ordering CTA -  as patient's CXRAY findings are not less than what is expected given the degree of hypoxia! thanks for all of your input.

## 2020-04-01 NOTE — PROGRESS NOTE ADULT - PROBLEM SELECTOR PLAN 2
Lantus 10 units SC daily  Sliding scale  BS controlled Increase Lantus 20 units SC daily  BS controlled

## 2020-04-02 PROCEDURE — 99233 SBSQ HOSP IP/OBS HIGH 50: CPT

## 2020-04-02 RX ORDER — INSULIN GLARGINE 100 [IU]/ML
30 INJECTION, SOLUTION SUBCUTANEOUS EVERY MORNING
Refills: 0 | Status: DISCONTINUED | OUTPATIENT
Start: 2020-04-02 | End: 2020-04-03

## 2020-04-02 RX ADMIN — CEFTRIAXONE 1000 MILLIGRAM(S): 500 INJECTION, POWDER, FOR SOLUTION INTRAMUSCULAR; INTRAVENOUS at 23:42

## 2020-04-02 RX ADMIN — Medication 45 MILLIGRAM(S): at 18:04

## 2020-04-02 RX ADMIN — Medication 200 MILLIGRAM(S): at 23:44

## 2020-04-02 RX ADMIN — INSULIN GLARGINE 20 UNIT(S): 100 INJECTION, SOLUTION SUBCUTANEOUS at 08:45

## 2020-04-02 RX ADMIN — Medication 200 MILLIGRAM(S): at 00:01

## 2020-04-02 RX ADMIN — Medication 45 MILLIGRAM(S): at 05:41

## 2020-04-02 RX ADMIN — Medication 200 MILLIGRAM(S): at 12:57

## 2020-04-02 RX ADMIN — ENOXAPARIN SODIUM 40 MILLIGRAM(S): 100 INJECTION SUBCUTANEOUS at 12:57

## 2020-04-02 RX ADMIN — CEFTRIAXONE 1000 MILLIGRAM(S): 500 INJECTION, POWDER, FOR SOLUTION INTRAMUSCULAR; INTRAVENOUS at 00:01

## 2020-04-02 NOTE — PROGRESS NOTE ADULT - SUBJECTIVE AND OBJECTIVE BOX
HPI:  Patient is a 39 y/o/f w/ a hx of Type 2 DM admitted in the hospital with cc of 2 weeks of generalized fatigue, body aches, 4 days of dry cough, 2 days of shortness of breath and fever.  Patient came to ED for difficulty breathing after coughing and while trying to go to the bathroom in the middle of the night.  Patient took Tylenol with minimal relief    In ED on O2 NRM; remains SOB with minimal activities. Received 1 dose of Azithromycin IV    Started on IV solumedrol / plaquinil /  Patient currently on NRB with sats (31 Mar 2020 12:01)        Subjective/Observations: Pt. seen and examined and evaluated. Pt. resting comfortably in bed in NAD, with no respiratory distress, no chest pain, dyspnea, palpitations, PND, or orthopnea.      REVIEW OF SYSTEMS: All other review of systems is negative unless indicated above    PAST MEDICAL & SURGICAL HISTORY:  Gestational Diabetes Mellitus:   H/O  section: 7 years  Lipoma: EXCISION -       MEDICATIONS  (STANDING):  cefTRIAXone Injectable. 1000 milliGRAM(s) IV Push every 24 hours  dextrose 50% Injectable 12.5 Gram(s) IV Push once  enoxaparin Injectable 40 milliGRAM(s) SubCutaneous daily  hydroxychloroquine   Oral   hydroxychloroquine 200 milliGRAM(s) Oral every 12 hours  insulin glargine Injectable (LANTUS) 20 Unit(s) SubCutaneous every morning  methylPREDNISolone sodium succinate Injectable 45 milliGRAM(s) IV Push every 12 hours    MEDICATIONS  (PRN):  acetaminophen   Tablet .. 650 milliGRAM(s) Oral every 6 hours PRN Mild Pain (1 - 3)  acetaminophen   Tablet .. 650 milliGRAM(s) Oral every 6 hours PRN Temp greater or equal to 38C (100.4F)  ALBUTerol    90 MICROgram(s) HFA Inhaler 2 Puff(s) Inhalation every 6 hours PRN Shortness of Breath and/or Wheezing  glucagon  Injectable 1 milliGRAM(s) IntraMuscular once PRN Glucose LESS THAN 70 milligrams/deciliter  guaiFENesin   Syrup  (Sugar-Free) 200 milliGRAM(s) Oral every 6 hours PRN Cough  ondansetron Injectable 4 milliGRAM(s) IV Push every 6 hours PRN Nausea and/or Vomiting      Allergies: amoxicillin (Unknown)    Intolerances    Vital Signs Last 24 Hrs  T(C): 36.7 (2020 06:04), Max: 36.7 (2020 20:55)  T(F): 98 (2020 06:04), Max: 98 (2020 20:55)  HR: 76 (2020 06:04) (61 - 76)  BP: 113/69 (2020 06:04) (112/72 - 125/73)  BP(mean): --  RR: 20 (2020 05:40) (20 - 20)  SpO2: 98% (2020 07:03) (94% - 99%)    I&O's Summary            LABS: All Labs Reviewed:                        Echo:    Stress Testing:     Cath:    Imaging:    Events Overnight:       Physical Exam:  Appearance: [ ] Normal  [ ] abnormal [ ] NAD   Eyes: [ ] PERRL [ ] EOMI  HEENT: [ ] Normal [ ] Abnormal oral mucosa [ ]NC/AT  Cardiovascular: [ ] S1 [ ] S2 [ ] RRR [ ] m/r/g [ ]edema [ ] JVP  Procedural Access Site: [ ]  hematoma [ ] tender to palpation [ ] 2+ pulse [ ] bruit [ ] Ecchymosis  Respiratory: [ ] Clear to auscultation bilaterally  Gastrointestinal: [ ] Soft [ ] tenderness[ ] distension [ ] BS  Musculoskeletal: [ ] clubbing [ ] joint deformity   Neurologic: [ ] Non-focal  Lymphatic: [ ] lymphadenopathy  Psychiatry: [ ] AAOx3  [ ] confused [ ] disoriented [ ] Mood & affect appropriate  Skin: [ ]  rashes [ ] ecchymoses [ ] cyanosis HPI:  Patient is a 39 y/o/f w/ a hx of Type 2 DM admitted in the hospital with cc of 2 weeks of generalized fatigue, body aches, 4 days of dry cough, 2 days of shortness of breath and fever.  Patient came to ED for difficulty breathing after coughing and while trying to go to the bathroom in the middle of the night.  Patient took Tylenol with minimal relief    In ED on O2 NRM; remains SOB with minimal activities. Received 1 dose of Azithromycin IV    Started on IV solumedrol / plaquinil /  Patient currently on NRB with sats (31 Mar 2020 12:01)        Subjective/Observations: Pt. seen and examined and evaluated. Pt. resting comfortably in bed in NAD, with no respiratory distress, no chest pain, dyspnea, palpitations, PND, or orthopnea. Pt. is on 100% non-rebreather       REVIEW OF SYSTEMS: All other review of systems is negative unless indicated above    PAST MEDICAL & SURGICAL HISTORY:  Gestational Diabetes Mellitus:   H/O  section: 7 years  Lipoma: EXCISION -       MEDICATIONS  (STANDING):  cefTRIAXone Injectable. 1000 milliGRAM(s) IV Push every 24 hours  dextrose 50% Injectable 12.5 Gram(s) IV Push once  enoxaparin Injectable 40 milliGRAM(s) SubCutaneous daily  hydroxychloroquine   Oral   hydroxychloroquine 200 milliGRAM(s) Oral every 12 hours  insulin glargine Injectable (LANTUS) 20 Unit(s) SubCutaneous every morning  methylPREDNISolone sodium succinate Injectable 45 milliGRAM(s) IV Push every 12 hours    MEDICATIONS  (PRN):  acetaminophen   Tablet .. 650 milliGRAM(s) Oral every 6 hours PRN Mild Pain (1 - 3)  acetaminophen   Tablet .. 650 milliGRAM(s) Oral every 6 hours PRN Temp greater or equal to 38C (100.4F)  ALBUTerol    90 MICROgram(s) HFA Inhaler 2 Puff(s) Inhalation every 6 hours PRN Shortness of Breath and/or Wheezing  glucagon  Injectable 1 milliGRAM(s) IntraMuscular once PRN Glucose LESS THAN 70 milligrams/deciliter  guaiFENesin   Syrup  (Sugar-Free) 200 milliGRAM(s) Oral every 6 hours PRN Cough  ondansetron Injectable 4 milliGRAM(s) IV Push every 6 hours PRN Nausea and/or Vomiting      Allergies: amoxicillin (Unknown)    Intolerances    Vital Signs Last 24 Hrs  T(C): 36.7 (2020 06:04), Max: 36.7 (2020 20:55)  T(F): 98 (2020 06:04), Max: 98 (2020 20:55)  HR: 76 (2020 06:04) (61 - 76)  BP: 113/69 (2020 06:04) (112/72 - 125/73)  BP(mean): --  RR: 20 (2020 05:40) (20 - 20)  SpO2: 98% (2020 07:03) (94% - 99%)    I&O's Summary            LABS: All Labs Reviewed:                        Echo:    Stress Testing:     Cath:    Imaging:    Events Overnight: No events noted overnight       Physical Exam:  Appearance: [ ] Normal  [ ] abnormal [X ] NAD   Eyes: [ ] PERRL [ ] EOMI  HEENT: [ ] Normal [ ] Abnormal oral mucosa [ ]NC/AT  Cardiovascular: [X ] S1 [X ] S2 [ ] RRR [ ] m/r/g [ ]edema [ ] JVP  Procedural Access Site: [ ]  hematoma [ ] tender to palpation [ ] 2+ pulse [ ] bruit [ ] Ecchymosis  Respiratory: [X] Diminished BS B/L   Gastrointestinal: [ ] Soft [ ] tenderness[ ] distension [ ] BS  Musculoskeletal: [ ] clubbing [ ] joint deformity   Neurologic: [ ] Non-focal  Lymphatic: [ ] lymphadenopathy  Psychiatry: [X ] AAOx3  [ ] confused [ ] disoriented [ ] Mood & affect appropriate  Skin: [ ]  rashes [ ] ecchymoses [ ] cyanosis HPI:  Patient is a 41 y/o/f w/ a hx of Type 2 DM admitted in the hospital with cc of 2 weeks of generalized fatigue, body aches, 4 days of dry cough, 2 days of shortness of breath and fever.  Patient came to ED for difficulty breathing after coughing and while trying to go to the bathroom in the middle of the night.  Patient took Tylenol with minimal relief    In ED on O2 NRM; remains SOB with minimal activities. Received 1 dose of Azithromycin IV    Started on IV solumedrol / plaquinil /  Patient currently on NRB with sats (31 Mar 2020 12:01)        Subjective/Observations: Pt. seen and examined and evaluated. Pt. resting comfortably in bed in NAD, with no respiratory distress, no chest pain, dyspnea, palpitations, PND, or orthopnea. Pt. is on 100% non-rebreather       REVIEW OF SYSTEMS: All other review of systems is negative unless indicated above    PAST MEDICAL & SURGICAL HISTORY:  Gestational Diabetes Mellitus:   H/O  section: 7 years  Lipoma: EXCISION -       MEDICATIONS  (STANDING):  cefTRIAXone Injectable. 1000 milliGRAM(s) IV Push every 24 hours  dextrose 50% Injectable 12.5 Gram(s) IV Push once  enoxaparin Injectable 40 milliGRAM(s) SubCutaneous daily  hydroxychloroquine   Oral   hydroxychloroquine 200 milliGRAM(s) Oral every 12 hours  insulin glargine Injectable (LANTUS) 20 Unit(s) SubCutaneous every morning  methylPREDNISolone sodium succinate Injectable 45 milliGRAM(s) IV Push every 12 hours    MEDICATIONS  (PRN):  acetaminophen   Tablet .. 650 milliGRAM(s) Oral every 6 hours PRN Mild Pain (1 - 3)  acetaminophen   Tablet .. 650 milliGRAM(s) Oral every 6 hours PRN Temp greater or equal to 38C (100.4F)  ALBUTerol    90 MICROgram(s) HFA Inhaler 2 Puff(s) Inhalation every 6 hours PRN Shortness of Breath and/or Wheezing  glucagon  Injectable 1 milliGRAM(s) IntraMuscular once PRN Glucose LESS THAN 70 milligrams/deciliter  guaiFENesin   Syrup  (Sugar-Free) 200 milliGRAM(s) Oral every 6 hours PRN Cough  ondansetron Injectable 4 milliGRAM(s) IV Push every 6 hours PRN Nausea and/or Vomiting      Allergies: amoxicillin (Unknown)    Intolerances    Vital Signs Last 24 Hrs  T(C): 36.7 (2020 06:04), Max: 36.7 (2020 20:55)  T(F): 98 (2020 06:04), Max: 98 (2020 20:55)  HR: 76 (2020 06:04) (61 - 76)  BP: 113/69 (2020 06:04) (112/72 - 125/73)  BP(mean): --  RR: 20 (2020 05:40) (20 - 20)  SpO2: 98% (2020 07:03) (94% - 99%)    I&O's Summary      LABS: All Labs Reviewed:             Imaging:  < from: Xray Chest 1 View-PORTABLE IMMEDIATE (20 @ 02:28) >  IMPRESSION:    Linear/patchy opacities in the left > right lung base, which may be due to atelectasis although pneumonia (bacterial or viral, i.e., COVID-19) cannot be excluded. Recommend correlation with history and clinical testing for further evaluation.        Events Overnight: No events noted overnight       Physical Exam:  Appearance: [ ] Normal  [ ] abnormal [X ] NAD   Eyes: [ ] PERRL [ ] EOMI  HEENT: [ ] Normal [ ] Abnormal oral mucosa [ ]NC/AT  Cardiovascular: [X ] S1 [X ] S2 [ ] RRR [ ] m/r/g [ ]edema [ ] JVP  Procedural Access Site: [ ]  hematoma [ ] tender to palpation [ ] 2+ pulse [ ] bruit [ ] Ecchymosis  Respiratory: [X] Diminished BS B/L   Gastrointestinal: [ ] Soft [ ] tenderness[ ] distension [ ] BS  Musculoskeletal: [ ] clubbing [ ] joint deformity   Neurologic: [ ] Non-focal  Lymphatic: [ ] lymphadenopathy  Psychiatry: [X ] AAOx3  [ ] confused [ ] disoriented [ ] Mood & affect appropriate  Skin: [ ]  rashes [ ] ecchymoses [ ] cyanosis

## 2020-04-02 NOTE — PROGRESS NOTE ADULT - SUBJECTIVE AND OBJECTIVE BOX
HPI:  Patient is a 39 y/o/f w/ a hx of Type 2 DM admitted in the hospital with cc of 2 weeks of generalized fatigue, body aches, 4 days of dry cough, 2 days of shortness of breath and fever.  Patient came to ED for difficulty breathing after coughing and while trying to go to the bathroom in the middle of the night.  Patient took Tylenol with minimal relief    In ED on O2 NRM; remains SOB with minimal activities. Received 1 dose of Azithromycin IV    Started on IV solumedrol / plaquinil /  Patient currently on NRB with sats (31 Mar 2020 12:01)    Subjective/Observations: frail respiratory state.       REVIEW OF SYSTEMS:   - All other review of systems is negative unless indicated above    Physical Exam:  GENERAL APPEARANCE:  NAD, hemodynamically stable, decodnitioned  T(C): 36.6 (20 @ 10:47), Max: 36.7 (20 @ 20:55)  HR: 73 (20 @ 10:47) (71 - 76)  BP: 109/67 (20 @ 10:47) (109/67 - 125/73)  RR: 19 (20 @ 10:47) (19 - 20)  SpO2: 100% (20 @ 10:47) (95% - 100%)  Wt(kg): --  HEENT:  Head is normocephalic    Skin:  Warm and dry without any rash   NECK:  Supple without lymphadenopathy.   HEART:  Regular rate and rhythm. normal S1 and S2, No M/R/G  LUNGS:  good ins/ expt effort / continues to have a frail respiratory status  ABDOMEN:  Soft, nontender, nondistended with good bowel sounds heard  EXTREMITIES:  Without cyanosis, clubbing or edema.   NEUROLOGICAL:  Gross nonfocal     PAST MEDICAL & SURGICAL HISTORY:  - Gestational Diabetes Mellitus: 2004  - H/O  section: 7 years  - Lipoma: EXCISION -     Allergies:   - amoxicillin (Unknown)    LABS:     CBC Full  -  ( 2020 07:59 )  WBC Count : 5.62 K/uL  RBC Count : 4.85 M/uL  Hemoglobin : 13.3 g/dL  Hematocrit : 40.5 %  Platelet Count - Automated : 364 K/uL  Mean Cell Volume : 83.5 fl  Mean Cell Hemoglobin : 27.4 pg  Mean Cell Hemoglobin Concentration : 32.8 gm/dL  Auto Neutrophil # : 4.33 K/uL  Auto Lymphocyte # : 1.12 K/uL  Auto Monocyte # : 0.11 K/uL  Auto Eosinophil # : 0.00 K/uL  Auto Basophil # : 0.00 K/uL  Auto Neutrophil % : 77.0 %  Auto Lymphocyte % : 20.0 %  Auto Monocyte % : 2.0 %  Auto Eosinophil % : 0.0 %  Auto Basophil % : 0.0 %    137  |  105  |  12  ----------------------------<  314<H>  3.9   |  21<L>  |  0.49<L>    Ca    8.6      2020 07:59    TPro  7.5  /  Alb  2.4<L>  /  TBili  0.4  /  DBili  x   /  AST  18  /  ALT  28  /  AlkPhos  92  04-01        # Acute hypoxic respiratory failure in the setting of viral pneumonia  - on IV Rocephin - tolerating antibiotics well  - Plaquenil 3/5  - 100% NRM - sating in the 90s  - continues O2 monitoring.     # Diabetes mellitus  - Increase Lantus 20 units SC daily  - BS controlled.      Problem/Plan - 3:  ·  Problem: D-dimer, elevated.  Plan: - patient's d-dimer is high  - Not ordering CTA -  as patient's CXRAY findings are not less than what is expected given the degree of hypoxia! thanks for all of your input. HPI:  Patient is a 39 y/o/f w/ a hx of Type 2 DM admitted in the hospital with cc of 2 weeks of generalized fatigue, body aches, 4 days of dry cough, 2 days of shortness of breath and fever.  Patient came to ED for difficulty breathing after coughing and while trying to go to the bathroom in the middle of the night.  Patient took Tylenol with minimal relief    In ED on O2 NRM; remains SOB with minimal activities. Received 1 dose of Azithromycin IV    Started on IV solumedrol / plaquinil /  Patient currently on NRB with sats (31 Mar 2020 12:01)    Subjective/Observations: frail respiratory state. on NRB sating 100. will try NC.       REVIEW OF SYSTEMS:   - All other review of systems is negative unless indicated above    Physical Exam:  GENERAL APPEARANCE:  NAD, hemodynamically stable, decodnitioned  T(C): 36.6 (20 @ 10:47), Max: 36.7 (20 @ 20:55)  HR: 73 (20 @ 10:47) (71 - 76)  BP: 109/67 (20 @ 10:47) (109/67 - 125/73)  RR: 19 (20 @ 10:47) (19 - 20)  SpO2: 100% (20 @ 10:47) (95% - 100%)  Wt(kg): --  HEENT:  Head is normocephalic    Skin:  Warm and dry without any rash   NECK:  Supple without lymphadenopathy.   HEART:  Regular rate and rhythm. normal S1 and S2, No M/R/G  LUNGS:  good ins/ expt effort / continues to have a frail respiratory status  ABDOMEN:  Soft, nontender, nondistended with good bowel sounds heard  EXTREMITIES:  Without cyanosis, clubbing or edema.   NEUROLOGICAL:  Gross nonfocal     PAST MEDICAL & SURGICAL HISTORY:  - Gestational Diabetes Mellitus: 2004  - H/O  section: 7 years  - Lipoma: EXCISION -     Allergies:   - amoxicillin (Unknown)    LABS:     CBC Full  -  ( 2020 07:59 )  WBC Count : 5.62 K/uL  RBC Count : 4.85 M/uL  Hemoglobin : 13.3 g/dL  Hematocrit : 40.5 %  Platelet Count - Automated : 364 K/uL  Mean Cell Volume : 83.5 fl  Mean Cell Hemoglobin : 27.4 pg  Mean Cell Hemoglobin Concentration : 32.8 gm/dL  Auto Neutrophil # : 4.33 K/uL  Auto Lymphocyte # : 1.12 K/uL  Auto Monocyte # : 0.11 K/uL  Auto Eosinophil # : 0.00 K/uL  Auto Basophil # : 0.00 K/uL  Auto Neutrophil % : 77.0 %  Auto Lymphocyte % : 20.0 %  Auto Monocyte % : 2.0 %  Auto Eosinophil % : 0.0 %  Auto Basophil % : 0.0 %    137  |  105  |  12  ----------------------------<  314<H>  3.9   |  21<L>  |  0.49<L>    Ca    8.6      2020 07:59    TPro  7.5  /  Alb  2.4<L>  /  TBili  0.4  /  DBili  x   /  AST  18  /  ALT  28  /  AlkPhos  92  04-01        # Acute hypoxic respiratory failure in the setting of viral pneumonia  - on IV Rocephin - tolerating antibiotics well  - Plaquenil 3/5  - 100% NRM - sating in the 90s  - continues O2 monitoring.     # Diabetes mellitus  - Increase Lantus 20 units SC daily  - BS controlled.      Problem/Plan - 3:  ·  Problem: D-dimer, elevated.  Plan: - patient's d-dimer is high  - Not ordering CTA -  as patient's CXRAY findings are not less than what is expected given the degree of hypoxia! thanks for all of your input. HPI:  Patient is a 41 y/o/f w/ a hx of Type 2 DM admitted in the hospital with cc of 2 weeks of generalized fatigue, body aches, 4 days of dry cough, 2 days of shortness of breath and fever.  Patient came to ED for difficulty breathing after coughing and while trying to go to the bathroom in the middle of the night.  Patient took Tylenol with minimal relief    In ED on O2 NRM; remains SOB with minimal activities. Received 1 dose of Azithromycin IV    Started on IV solumedrol / plaquinil /  Patient currently on NRB with sats (31 Mar 2020 12:01)    Subjective/Observations: frail respiratory state. on NRB sating 100. will try NC. Patient's CRP is trending down - hopefully this is an indication for decreased cytokine surge      REVIEW OF SYSTEMS:   - All other review of systems is negative unless indicated above    Physical Exam:  GENERAL APPEARANCE:  NAD, hemodynamically stable, decodnitioned  T(C): 36.6 (20 @ 10:47), Max: 36.7 (20 @ 20:55)  HR: 73 (20 @ 10:47) (71 - 76)  BP: 109/67 (20 @ 10:47) (109/67 - 125/73)  RR: 19 (20 @ 10:47) (19 - 20)  SpO2: 100% (20 @ 10:47) (95% - 100%)  Wt(kg): --  HEENT:  Head is normocephalic    Skin:  Warm and dry without any rash   NECK:  Supple without lymphadenopathy.   HEART:  Regular rate and rhythm. normal S1 and S2, No M/R/G  LUNGS:  good ins/ expt effort / continues to have a frail respiratory status  ABDOMEN:  Soft, nontender, nondistended with good bowel sounds heard  EXTREMITIES:  Without cyanosis, clubbing or edema.   NEUROLOGICAL:  Gross nonfocal     PAST MEDICAL & SURGICAL HISTORY:  - Gestational Diabetes Mellitus: 2004  - H/O  section: 7 years  - Lipoma: EXCISION -     Allergies:   - amoxicillin (Unknown)    LABS:     CBC Full  -  ( 2020 07:59 )  WBC Count : 5.62 K/uL  RBC Count : 4.85 M/uL  Hemoglobin : 13.3 g/dL  Hematocrit : 40.5 %  Platelet Count - Automated : 364 K/uL  Mean Cell Volume : 83.5 fl  Mean Cell Hemoglobin : 27.4 pg  Mean Cell Hemoglobin Concentration : 32.8 gm/dL  Auto Neutrophil # : 4.33 K/uL  Auto Lymphocyte # : 1.12 K/uL  Auto Monocyte # : 0.11 K/uL  Auto Eosinophil # : 0.00 K/uL  Auto Basophil # : 0.00 K/uL  Auto Neutrophil % : 77.0 %  Auto Lymphocyte % : 20.0 %  Auto Monocyte % : 2.0 %  Auto Eosinophil % : 0.0 %  Auto Basophil % : 0.0 %    137  |  105  |  12  ----------------------------<  314<H>  3.9   |  21<L>  |  0.49<L>    Ca    8.6      2020 07:59    TPro  7.5  /  Alb  2.4<L>  /  TBili  0.4  /  DBili  x   /  AST  18  /  ALT  28  /  AlkPhos  92  04-01        # Acute hypoxic respiratory failure in the setting of viral pneumonia  - on IV Rocephin - tolerating antibiotics well  - Plaquenil 3/5  - 100% NRM - sating in the 90s  - continues O2 monitoring.     # Diabetes mellitus  - Increase Lantus 20 units SC daily  - BS controlled.      Problem/Plan - 3:  ·  Problem: D-dimer, elevated.  Plan: - patient's d-dimer is high  - Not ordering CTA -  as patient's CXRAY findings are not less than what is expected given the degree of hypoxia! thanks for all of your input.

## 2020-04-02 NOTE — [facility]I QUERY NOTE - NSCDIOTHERTXTBX2_GEN_ALL_CORE_FT
Patient admitted with SOB/ Hypoxia  + COVID-19  pneumonia documented    Please further clarify the type of pneumonia  a) COVID- 19 pneumonia  b) Viral pneumonia due to COVID-19  c) Other type of pneumonia, please clarify

## 2020-04-02 NOTE — [facility]I QUERY NOTE - NSCDIOTHERTXTBX_GEN_ALL_CORE_HH
Patient admitted with SOB/ Hypoxia  + COVID-19  pneumonia documented    On admit hypoxia and SOB  noted  RR= 36 > 26  O2 sat= 83% on room air  Placed on %    Can the patient's respiratory status be further clarified  a) Acute hypoxic respiratory failure  b) No clinical evidence of respiratory failure  c) Other, please clarify.

## 2020-04-02 NOTE — PROGRESS NOTE ADULT - ASSESSMENT
40 year old female with PMH of DM admitted with myalgia, cough and worsening SOB. CXR revealed Linear/patchy opacities in the left > right lung base. + COVID 19.        PNA/ +COVID 19  C/W Rocephin  IVPB  C/W Hydoxychloroquin   C/W 100% non-rebreather   C/W continuous O2 monitoring       Diabetes  -C/W Lantus 20units

## 2020-04-03 PROCEDURE — 99232 SBSQ HOSP IP/OBS MODERATE 35: CPT

## 2020-04-03 RX ORDER — DEXTROSE 50 % IN WATER 50 %
25 SYRINGE (ML) INTRAVENOUS ONCE
Refills: 0 | Status: DISCONTINUED | OUTPATIENT
Start: 2020-04-03 | End: 2020-04-05

## 2020-04-03 RX ORDER — DEXTROSE 50 % IN WATER 50 %
12.5 SYRINGE (ML) INTRAVENOUS ONCE
Refills: 0 | Status: DISCONTINUED | OUTPATIENT
Start: 2020-04-03 | End: 2020-04-05

## 2020-04-03 RX ORDER — INSULIN LISPRO 100/ML
VIAL (ML) SUBCUTANEOUS
Refills: 0 | Status: DISCONTINUED | OUTPATIENT
Start: 2020-04-03 | End: 2020-04-05

## 2020-04-03 RX ORDER — SODIUM CHLORIDE 9 MG/ML
1000 INJECTION, SOLUTION INTRAVENOUS
Refills: 0 | Status: DISCONTINUED | OUTPATIENT
Start: 2020-04-03 | End: 2020-04-05

## 2020-04-03 RX ORDER — INSULIN LISPRO 100/ML
5 VIAL (ML) SUBCUTANEOUS
Refills: 0 | Status: DISCONTINUED | OUTPATIENT
Start: 2020-04-03 | End: 2020-04-04

## 2020-04-03 RX ORDER — DEXTROSE 50 % IN WATER 50 %
15 SYRINGE (ML) INTRAVENOUS ONCE
Refills: 0 | Status: DISCONTINUED | OUTPATIENT
Start: 2020-04-03 | End: 2020-04-05

## 2020-04-03 RX ORDER — INSULIN GLARGINE 100 [IU]/ML
35 INJECTION, SOLUTION SUBCUTANEOUS AT BEDTIME
Refills: 0 | Status: DISCONTINUED | OUTPATIENT
Start: 2020-04-03 | End: 2020-04-04

## 2020-04-03 RX ORDER — INSULIN LISPRO 100/ML
VIAL (ML) SUBCUTANEOUS AT BEDTIME
Refills: 0 | Status: DISCONTINUED | OUTPATIENT
Start: 2020-04-03 | End: 2020-04-05

## 2020-04-03 RX ORDER — GLUCAGON INJECTION, SOLUTION 0.5 MG/.1ML
1 INJECTION, SOLUTION SUBCUTANEOUS ONCE
Refills: 0 | Status: DISCONTINUED | OUTPATIENT
Start: 2020-04-03 | End: 2020-04-05

## 2020-04-03 RX ADMIN — Medication 45 MILLIGRAM(S): at 06:50

## 2020-04-03 RX ADMIN — Medication 45 MILLIGRAM(S): at 17:13

## 2020-04-03 RX ADMIN — CEFTRIAXONE 1000 MILLIGRAM(S): 500 INJECTION, POWDER, FOR SOLUTION INTRAMUSCULAR; INTRAVENOUS at 21:42

## 2020-04-03 RX ADMIN — Medication 200 MILLIGRAM(S): at 20:15

## 2020-04-03 RX ADMIN — ENOXAPARIN SODIUM 40 MILLIGRAM(S): 100 INJECTION SUBCUTANEOUS at 13:24

## 2020-04-03 RX ADMIN — INSULIN GLARGINE 30 UNIT(S): 100 INJECTION, SOLUTION SUBCUTANEOUS at 09:39

## 2020-04-03 RX ADMIN — Medication 5 UNIT(S): at 13:32

## 2020-04-03 RX ADMIN — Medication 200 MILLIGRAM(S): at 13:24

## 2020-04-03 RX ADMIN — Medication 6: at 21:41

## 2020-04-03 RX ADMIN — Medication 5 UNIT(S): at 18:23

## 2020-04-03 RX ADMIN — INSULIN GLARGINE 35 UNIT(S): 100 INJECTION, SOLUTION SUBCUTANEOUS at 21:41

## 2020-04-03 RX ADMIN — Medication 10: at 18:23

## 2020-04-03 RX ADMIN — Medication 12: at 13:31

## 2020-04-03 NOTE — PROGRESS NOTE ADULT - ASSESSMENT
40 year old female with PMH of DM admitted with myalgia, cough and worsening SOB. CXR revealed Linear/patchy opacities in the left > right lung base. + COVID 19.        Acute hypoxic respiratory failure in the setting of viral pneumonia +COVID 19  C/W Rocephin  IVPB  C/W Hydoxychloroquin   C/W 100% non-rebreather   C/W continuous O2 monitoring     Diabetes  -C/W Lantus 20units SQ daily         Elevated D-dimer    Not ordering CTA -  as patient's CXRAY findings are not less than what is expected given the degree of hypoxia! 40 year old female with PMH of DM admitted with myalgia, cough and worsening SOB. CXR revealed Linear/patchy opacities in the left > right lung base. + COVID 19.        Acute hypoxic respiratory failure in the setting of viral pneumonia +COVID 19  C/W Rocephin  IVPB  C/W Hydoxychloroquin   C/W 100% non-rebreather   C/W continuous O2 monitoring     Diabetes  Finger stick AC/HS  Lantus increased to 35 units daily         Elevated D-dimer    Not ordering CTA -  as patient's CXRAY findings are not less than what is expected given the degree of hypoxia 40 year old female with PMH of DM admitted with myalgia, cough and worsening SOB. CXR revealed Linear/patchy opacities in the left > right lung base. + COVID 19.        Acute hypoxic respiratory failure in the setting of viral pneumonia +COVID 19  C/W Rocephin  IVPB  C/W Hydoxychloroquin   C/W 100% non-rebreather   C/W continuous O2 monitoring     Diabetes  Finger stick AC/HS  Lantus increased to 35 units daily         Elevated D-dimer    Not ordering CTA -  as patient's CXR findings are not less than what is expected given the degree of hypoxia

## 2020-04-03 NOTE — PROGRESS NOTE ADULT - SUBJECTIVE AND OBJECTIVE BOX
HPI:  Patient is a 41 y/o/f w/ a hx of Type 2 DM admitted in the hospital with cc of 2 weeks of generalized fatigue, body aches, 4 days of dry cough, 2 days of shortness of breath and fever.  Patient came to ED for difficulty breathing after coughing and while trying to go to the bathroom in the middle of the night.  Patient took Tylenol with minimal relief    In ED on O2 NRM; remains SOB with minimal activities. Received 1 dose of Azithromycin IV    Started on IV solumedrol / plaquinil /  Patient currently on NRB with sats (31 Mar 2020 12:01)        Subjective/Observations: Pt. seen and examined and evaluated. Pt. resting comfortably in bed in NAD, with no respiratory distress, no chest pain, dyspnea, palpitations, PND, or orthopnea.      REVIEW OF SYSTEMS: All other review of systems is negative unless indicated above    PAST MEDICAL & SURGICAL HISTORY:  Gestational Diabetes Mellitus:   H/O  section: 7 years  Lipoma: EXCISION -       MEDICATIONS  (STANDING):  cefTRIAXone Injectable. 1000 milliGRAM(s) IV Push every 24 hours  dextrose 50% Injectable 12.5 Gram(s) IV Push once  enoxaparin Injectable 40 milliGRAM(s) SubCutaneous daily  hydroxychloroquine   Oral   hydroxychloroquine 200 milliGRAM(s) Oral every 12 hours  insulin glargine Injectable (LANTUS) 30 Unit(s) SubCutaneous every morning  methylPREDNISolone sodium succinate Injectable 45 milliGRAM(s) IV Push every 12 hours    MEDICATIONS  (PRN):  acetaminophen   Tablet .. 650 milliGRAM(s) Oral every 6 hours PRN Mild Pain (1 - 3)  acetaminophen   Tablet .. 650 milliGRAM(s) Oral every 6 hours PRN Temp greater or equal to 38C (100.4F)  ALBUTerol    90 MICROgram(s) HFA Inhaler 2 Puff(s) Inhalation every 6 hours PRN Shortness of Breath and/or Wheezing  glucagon  Injectable 1 milliGRAM(s) IntraMuscular once PRN Glucose LESS THAN 70 milligrams/deciliter  guaiFENesin   Syrup  (Sugar-Free) 200 milliGRAM(s) Oral every 6 hours PRN Cough  ondansetron Injectable 4 milliGRAM(s) IV Push every 6 hours PRN Nausea and/or Vomiting      Allergies    amoxicillin (Unknown)    Intolerances          Vital Signs Last 24 Hrs  T(C): 36.5 (2020 05:13), Max: 36.6 (2020 10:47)  T(F): 97.7 (2020 05:13), Max: 97.9 (2020 10:47)  HR: 42 (2020 05:13) (42 - 73)  BP: 118/61 (2020 05:13) (109/67 - 118/61)  BP(mean): --  RR: 18 (2020 05:13) (18 - 19)  SpO2: 92% (2020 05:13) (92% - 100%)    I&O's Summary            LABS: All Labs Reviewed:                        13.3   5.62  )-----------( 364      ( 2020 07:59 )             40.5     2020 07:59    137    |  105    |  12     ----------------------------<  314    3.9     |  21     |  0.49     Ca    8.6        2020 07:59    TPro  7.5    /  Alb  2.4    /  TBili  0.4    /  DBili  x      /  AST  18     /  ALT  28     /  AlkPhos  92     2020 07:59               Echo:    Stress Testing:     Cath:    Imaging:    Events Overnight:       Physical Exam:  Appearance: [ ] Normal  [ ] abnormal [ ] NAD   Eyes: [ ] PERRL [ ] EOMI  HEENT: [ ] Normal [ ] Abnormal oral mucosa [ ]NC/AT  Cardiovascular: [ ] S1 [ ] S2 [ ] RRR [ ] m/r/g [ ]edema [ ] JVP  Procedural Access Site: [ ]  hematoma [ ] tender to palpation [ ] 2+ pulse [ ] bruit [ ] Ecchymosis  Respiratory: [ ] Clear to auscultation bilaterally  Gastrointestinal: [ ] Soft [ ] tenderness[ ] distension [ ] BS  Musculoskeletal: [ ] clubbing [ ] joint deformity   Neurologic: [ ] Non-focal  Lymphatic: [ ] lymphadenopathy  Psychiatry: [ ] AAOx3  [ ] confused [ ] disoriented [ ] Mood & affect appropriate  Skin: [ ]  rashes [ ] ecchymoses [ ] cyanosis HPI:  Patient is a 39 y/o/f w/ a hx of Type 2 DM admitted in the hospital with cc of 2 weeks of generalized fatigue, body aches, 4 days of dry cough, 2 days of shortness of breath and fever.  Patient came to ED for difficulty breathing after coughing and while trying to go to the bathroom in the middle of the night.  Patient took Tylenol with minimal relief    In ED on O2 NRM; remains SOB with minimal activities. Received 1 dose of Azithromycin IV    Started on IV solumedrol / plaquinil /  Patient currently on NRB with sats (31 Mar 2020 12:01)        Subjective/Observations: Pt. seen and examined and evaluated. Pt. resting comfortably in bed in NAD, with no respiratory distress, no chest pain, dyspnea, palpitations, PND, or orthopnea. Pt. is on 3L NC pulse ox 98%     REVIEW OF SYSTEMS: All other review of systems is negative unless indicated above    PAST MEDICAL & SURGICAL HISTORY:  Gestational Diabetes Mellitus:   H/O  section: 7 years  Lipoma: EXCISION -       MEDICATIONS  (STANDING):  cefTRIAXone Injectable. 1000 milliGRAM(s) IV Push every 24 hours  dextrose 50% Injectable 12.5 Gram(s) IV Push once  enoxaparin Injectable 40 milliGRAM(s) SubCutaneous daily  hydroxychloroquine   Oral   hydroxychloroquine 200 milliGRAM(s) Oral every 12 hours  insulin glargine Injectable (LANTUS) 30 Unit(s) SubCutaneous every morning  methylPREDNISolone sodium succinate Injectable 45 milliGRAM(s) IV Push every 12 hours    MEDICATIONS  (PRN):  acetaminophen   Tablet .. 650 milliGRAM(s) Oral every 6 hours PRN Mild Pain (1 - 3)  acetaminophen   Tablet .. 650 milliGRAM(s) Oral every 6 hours PRN Temp greater or equal to 38C (100.4F)  ALBUTerol    90 MICROgram(s) HFA Inhaler 2 Puff(s) Inhalation every 6 hours PRN Shortness of Breath and/or Wheezing  glucagon  Injectable 1 milliGRAM(s) IntraMuscular once PRN Glucose LESS THAN 70 milligrams/deciliter  guaiFENesin   Syrup  (Sugar-Free) 200 milliGRAM(s) Oral every 6 hours PRN Cough  ondansetron Injectable 4 milliGRAM(s) IV Push every 6 hours PRN Nausea and/or Vomiting      Allergies: amoxicillin (Unknown)      Vital Signs Last 24 Hrs  T(C): 36.5 (2020 05:13), Max: 36.6 (2020 10:47)  T(F): 97.7 (2020 05:13), Max: 97.9 (2020 10:47)  HR: 42 (2020 05:13) (42 - 73)  BP: 118/61 (2020 05:13) (109/67 - 118/61)  BP(mean): --  RR: 18 (2020 05:13) (18 - 19)  SpO2: 92% (2020 05:13) (92% - 100%)    I&O's Summary          Telemetry: Pt. is not on telemetry    Events Overnight: No events noted overnight      Physical Exam:  Appearance: [ ] Normal  [ ] abnormal [X ] NAD   Eyes: [ ] PERRL [ ] EOMI  HEENT: [ ] Normal [ ] Abnormal oral mucosa [ ]NC/AT  Cardiovascular: [X ] S1 [ ] S2 [ ] RRR [ ] m/r/g [ ]edema [ ] JVP  Procedural Access Site: [ ]  hematoma [ ] tender to palpation [ ] 2+ pulse [ ] bruit [ ] Ecchymosis  Respiratory: [X ] B/L upper lobes clear diminished BS noted B/L lower lobes   Gastrointestinal: [ ] Soft [ ] tenderness[ ] distension [ ] BS  Musculoskeletal: [ ] clubbing [ ] joint deformity   Neurologic: [ ] Non-focal  Lymphatic: [ ] lymphadenopathy  Psychiatry: [X ] AAOx3  [ ] confused [ ] disoriented [ ] Mood & affect appropriate  Skin: [ ]  rashes [ ] ecchymoses [ ] cyanosis

## 2020-04-04 LAB
CULTURE RESULTS: SIGNIFICANT CHANGE UP
SPECIMEN SOURCE: SIGNIFICANT CHANGE UP

## 2020-04-04 PROCEDURE — 99232 SBSQ HOSP IP/OBS MODERATE 35: CPT

## 2020-04-04 RX ORDER — INSULIN LISPRO 100/ML
10 VIAL (ML) SUBCUTANEOUS
Refills: 0 | Status: DISCONTINUED | OUTPATIENT
Start: 2020-04-04 | End: 2020-04-05

## 2020-04-04 RX ORDER — INSULIN GLARGINE 100 [IU]/ML
45 INJECTION, SOLUTION SUBCUTANEOUS AT BEDTIME
Refills: 0 | Status: DISCONTINUED | OUTPATIENT
Start: 2020-04-04 | End: 2020-04-05

## 2020-04-04 RX ADMIN — Medication 45 MILLIGRAM(S): at 18:30

## 2020-04-04 RX ADMIN — Medication 10: at 08:57

## 2020-04-04 RX ADMIN — Medication 5 UNIT(S): at 13:59

## 2020-04-04 RX ADMIN — Medication 1200 MILLIGRAM(S): at 18:31

## 2020-04-04 RX ADMIN — INSULIN GLARGINE 45 UNIT(S): 100 INJECTION, SOLUTION SUBCUTANEOUS at 21:39

## 2020-04-04 RX ADMIN — Medication 45 MILLIGRAM(S): at 04:36

## 2020-04-04 RX ADMIN — Medication 5 UNIT(S): at 08:57

## 2020-04-04 RX ADMIN — ENOXAPARIN SODIUM 40 MILLIGRAM(S): 100 INJECTION SUBCUTANEOUS at 11:00

## 2020-04-04 RX ADMIN — CEFTRIAXONE 1000 MILLIGRAM(S): 500 INJECTION, POWDER, FOR SOLUTION INTRAMUSCULAR; INTRAVENOUS at 21:40

## 2020-04-04 RX ADMIN — Medication 5 UNIT(S): at 18:21

## 2020-04-04 RX ADMIN — Medication 200 MILLIGRAM(S): at 13:57

## 2020-04-04 RX ADMIN — Medication 12: at 18:23

## 2020-04-04 RX ADMIN — Medication 6: at 21:41

## 2020-04-04 NOTE — PROGRESS NOTE ADULT - ASSESSMENT
40 year old female with PMH of DM a admitted for:     Acute hypoxic respiratory failure in the setting of viral pneumonia +COVID 19    Respiratory status improving, Off NRB mask, now tolerates NC, will titrate down   C/W Rocephin  IVPB, for possible secondary bacterial PNA  Off Azithro due to plaquenil  C/W Hydroxychloroquine    ON IV solumedrol, will taper down   Supportive care     Uncontrolled  Diabetes with hyperglycemia   BS elevated despite increased LAntus dose , also  fixed  premeal coverage added    Finger stick AC/HS  Increase LAntus to 45U QHS, increase premeal to 10U with ISS  Decrease steroids   HB A1c >10      DVT PPX:  lovenox

## 2020-04-04 NOTE — PROGRESS NOTE ADULT - SUBJECTIVE AND OBJECTIVE BOX
CC: SOB and generalized fatigue (03 Apr 2020 08:33)    HPI:  Patient is a 41 y/o/f w/ a hx of Type 2 DM admitted in the hospital with cc of 2 weeks of generalized fatigue, body aches, 4 days of dry cough, 2 days of shortness of breath and fever.  Patient came to ED for difficulty breathing after coughing and while trying to go to the bathroom in the middle of the night.  Patient took Tylenol with minimal relief    In ED on O2 NRM; remains SOB with minimal activities. Received 1 dose of Azithromycin IV      INTERVAL HPI/ OVERNIGHT EVENTS: Chart reviewed, Pt was seen and examined, reports start feeling better, no SOB on NC, + cough no phlegm , ok PO intake     Vital Signs Last 24 Hrs  T(C): 36.6 (04 Apr 2020 05:06), Max: 36.8 (03 Apr 2020 21:29)  T(F): 97.9 (04 Apr 2020 05:06), Max: 98.3 (03 Apr 2020 21:29)  HR: 50 (04 Apr 2020 05:06) (50 - 80)  BP: 111/69 (04 Apr 2020 05:06) (111/69 - 123/67)  RR: 22 (04 Apr 2020 05:06) (18 - 22)  SpO2: 94% (04 Apr 2020 05:06) (94% - 96%)      REVIEW OF SYSTEMS:  All other review of systems is negative unless indicated above.      PHYSICAL EXAM:  General: Well developed; in no acute distress On NC  Eyes: PERRLA, EOMI; conjunctiva and sclera clear  Head: Normocephalic; atraumatic  ENMT: No nasal discharge; airway clear  Neck: Supple; no JVD  Respiratory: Decreased BS at bases, No wheezes, rales or rhonchi  Cardiovascular: Regular rate and rhythm. S1 and S2 Normal; No murmurs  Gastrointestinal: Soft non-tender non-distended; Normal bowel sounds  Genitourinary: No  suprapubic  tenderness  Extremities: Normal range of motion, No  edema  Vascular: Peripheral pulses palpable 2+ bilaterally  Neurological: Alert and oriented x3, non focal   Skin: Warm and dry. No acute rash  Musculoskeletal: Normal muscle tone and strength    Psychiatric: Cooperative and appropriate      LABS     CAPILLARY BLOOD GLUCOSE  POCT Blood Glucose.: 360 mg/dL (04 Apr 2020 08:15)  POCT Blood Glucose.: 381 mg/dL (03 Apr 2020 21:10)  POCT Blood Glucose.: 386 mg/dL (03 Apr 2020 18:17)        Hemoglobin A1C, Whole Blood: 10.5 % (04-01-20 @ 07:59)    MEDICATIONS  (STANDING):  cefTRIAXone Injectable. 1000 milliGRAM(s) IV Push every 24 hours  dextrose 5%. 1000 milliLiter(s) (50 mL/Hr) IV Continuous <Continuous>  dextrose 50% Injectable 12.5 Gram(s) IV Push once  dextrose 50% Injectable 25 Gram(s) IV Push once  dextrose 50% Injectable 25 Gram(s) IV Push once  enoxaparin Injectable 40 milliGRAM(s) SubCutaneous daily  hydroxychloroquine   Oral   hydroxychloroquine 200 milliGRAM(s) Oral every 12 hours  insulin glargine Injectable (LANTUS) 35 Unit(s) SubCutaneous at bedtime  insulin lispro (HumaLOG) corrective regimen sliding scale   SubCutaneous three times a day before meals  insulin lispro (HumaLOG) corrective regimen sliding scale   SubCutaneous at bedtime  insulin lispro Injectable (HumaLOG) 5 Unit(s) SubCutaneous three times a day before meals  methylPREDNISolone sodium succinate Injectable 45 milliGRAM(s) IV Push every 12 hours    MEDICATIONS  (PRN):  acetaminophen   Tablet .. 650 milliGRAM(s) Oral every 6 hours PRN Mild Pain (1 - 3)  acetaminophen   Tablet .. 650 milliGRAM(s) Oral every 6 hours PRN Temp greater or equal to 38C (100.4F)  ALBUTerol    90 MICROgram(s) HFA Inhaler 2 Puff(s) Inhalation every 6 hours PRN Shortness of Breath and/or Wheezing  dextrose 40% Gel 15 Gram(s) Oral once PRN Blood Glucose LESS THAN 70 milliGRAM(s)/deciliter  glucagon  Injectable 1 milliGRAM(s) IntraMuscular once PRN Glucose LESS THAN 70 milligrams/deciliter  guaiFENesin   Syrup  (Sugar-Free) 200 milliGRAM(s) Oral every 6 hours PRN Cough  ondansetron Injectable 4 milliGRAM(s) IV Push every 6 hours PRN Nausea and/or Vomiting      RADIOLOGY & ADDITIONAL TESTS:

## 2020-04-04 NOTE — PROGRESS NOTE ADULT - REASON FOR ADMISSION
SOB and generalized fatigue
No

## 2020-04-05 ENCOUNTER — TRANSCRIPTION ENCOUNTER (OUTPATIENT)
Age: 40
End: 2020-04-05

## 2020-04-05 VITALS
DIASTOLIC BLOOD PRESSURE: 59 MMHG | RESPIRATION RATE: 19 BRPM | OXYGEN SATURATION: 90 % | TEMPERATURE: 98 F | SYSTOLIC BLOOD PRESSURE: 97 MMHG | HEART RATE: 66 BPM

## 2020-04-05 LAB
ANION GAP SERPL CALC-SCNC: 8 MMOL/L — SIGNIFICANT CHANGE UP (ref 5–17)
BUN SERPL-MCNC: 18 MG/DL — SIGNIFICANT CHANGE UP (ref 7–23)
CALCIUM SERPL-MCNC: 8.8 MG/DL — SIGNIFICANT CHANGE UP (ref 8.5–10.1)
CHLORIDE SERPL-SCNC: 100 MMOL/L — SIGNIFICANT CHANGE UP (ref 96–108)
CO2 SERPL-SCNC: 25 MMOL/L — SIGNIFICANT CHANGE UP (ref 22–31)
CREAT SERPL-MCNC: 0.63 MG/DL — SIGNIFICANT CHANGE UP (ref 0.5–1.3)
GLUCOSE SERPL-MCNC: 414 MG/DL — HIGH (ref 70–99)
HCT VFR BLD CALC: 41.8 % — SIGNIFICANT CHANGE UP (ref 34.5–45)
HGB BLD-MCNC: 14.2 G/DL — SIGNIFICANT CHANGE UP (ref 11.5–15.5)
MCHC RBC-ENTMCNC: 27.7 PG — SIGNIFICANT CHANGE UP (ref 27–34)
MCHC RBC-ENTMCNC: 34 GM/DL — SIGNIFICANT CHANGE UP (ref 32–36)
MCV RBC AUTO: 81.5 FL — SIGNIFICANT CHANGE UP (ref 80–100)
PLATELET # BLD AUTO: 315 K/UL — SIGNIFICANT CHANGE UP (ref 150–400)
POTASSIUM SERPL-MCNC: 4 MMOL/L — SIGNIFICANT CHANGE UP (ref 3.5–5.3)
POTASSIUM SERPL-SCNC: 4 MMOL/L — SIGNIFICANT CHANGE UP (ref 3.5–5.3)
RBC # BLD: 5.13 M/UL — SIGNIFICANT CHANGE UP (ref 3.8–5.2)
RBC # FLD: 11.8 % — SIGNIFICANT CHANGE UP (ref 10.3–14.5)
SODIUM SERPL-SCNC: 133 MMOL/L — LOW (ref 135–145)
WBC # BLD: 8.71 K/UL — SIGNIFICANT CHANGE UP (ref 3.8–10.5)
WBC # FLD AUTO: 8.71 K/UL — SIGNIFICANT CHANGE UP (ref 3.8–10.5)

## 2020-04-05 PROCEDURE — 99239 HOSP IP/OBS DSCHRG MGMT >30: CPT

## 2020-04-05 RX ORDER — METFORMIN HYDROCHLORIDE 850 MG/1
1 TABLET ORAL
Qty: 60 | Refills: 0
Start: 2020-04-05 | End: 2020-05-04

## 2020-04-05 RX ORDER — SITAGLIPTIN 50 MG/1
1 TABLET, FILM COATED ORAL
Qty: 30 | Refills: 0
Start: 2020-04-05 | End: 2020-05-04

## 2020-04-05 RX ORDER — ALBUTEROL 90 UG/1
2 AEROSOL, METERED ORAL
Qty: 1 | Refills: 0
Start: 2020-04-05 | End: 2020-04-18

## 2020-04-05 RX ADMIN — Medication 10 UNIT(S): at 12:36

## 2020-04-05 RX ADMIN — Medication 1200 MILLIGRAM(S): at 18:36

## 2020-04-05 RX ADMIN — Medication 200 MILLIGRAM(S): at 01:35

## 2020-04-05 RX ADMIN — Medication 10: at 12:36

## 2020-04-05 RX ADMIN — Medication 40 MILLIGRAM(S): at 04:27

## 2020-04-05 RX ADMIN — Medication 10 UNIT(S): at 12:22

## 2020-04-05 RX ADMIN — Medication 1200 MILLIGRAM(S): at 04:26

## 2020-04-05 RX ADMIN — Medication 10: at 07:49

## 2020-04-05 NOTE — DISCHARGE NOTE PROVIDER - NSDCCPCAREPLAN_GEN_ALL_CORE_FT
PRINCIPAL DISCHARGE DIAGNOSIS  Diagnosis: Pneumonia, unspecified organism  Assessment and Plan of Treatment: do not exert yourself  self isolate      SECONDARY DISCHARGE DIAGNOSES  Diagnosis: Diabetes  Assessment and Plan of Treatment: Not controlled  follow diabetic diet   C/w  metformin and januvia, new meds   F/u with PCP for further management    Diagnosis: Hypoxia  Assessment and Plan of Treatment:     Diagnosis: Dyspnea, unspecified type  Assessment and Plan of Treatment:

## 2020-04-05 NOTE — DISCHARGE NOTE PROVIDER - CARE PROVIDER_API CALL
Cassy Catalan)  Family Medicine  [address] [place] [address]  Phone: [phone]  Fax: [phone]  Follow Up Time: 1 week

## 2020-04-05 NOTE — DISCHARGE NOTE NURSING/CASE MANAGEMENT/SOCIAL WORK - PATIENT PORTAL LINK FT
You can access the FollowMyHealth Patient Portal offered by [facility] by registering at the following website: [email or web address]. By joining [email or web address]’s FollowMyHealth portal, you will also be able to view your health information using other applications (apps) compatible with our system.

## 2020-04-05 NOTE — DISCHARGE NOTE PROVIDER - HOSPITAL COURSE
40 y.o female  with PMH of  of Type 2 DM  presented to the hospital with  2 weeks of generalized fatigue, body aches, 4 days of dry cough, 2 days of shortness of breath and fever.  She reported SOB with ambulation.     In ED found to be hypoxic, started on O2 NRM; CXR b/l infiltrates, + COVID.    Pt was continued on NRB mask and was started on Plaquenil, Rocephin was added, azithro received 1 dose, was d/c due to Plaquenil use. Pt was also given IV Steroids. Respiratory status slowly improved. Today tolerates RA, ambulated in the room with no SOB and pulse ox remained above 92%. D/c planning and self isolation and precautions discussed.         Vital Signs Last 24 Hrs    T(C): 36.6 (05 Apr 2020 11:16), Max: 36.9 (05 Apr 2020 04:48)    T(F): 97.8 (05 Apr 2020 11:16), Max: 98.5 (05 Apr 2020 04:48)    HR: 66 (05 Apr 2020 11:16) (49 - 66)    BP: 97/59 (05 Apr 2020 11:16) (97/59 - 109/67)    RR: 19 (05 Apr 2020 11:16) (19 - 20)    SpO2: 90% (05 Apr 2020 11:16) (90% - 95%)        PHYSICAL EXAM:    General: Well developed; in no acute distress On NC    Eyes: PERRLA, EOMI; conjunctiva and sclera clear    Head: Normocephalic; atraumatic    ENMT: No nasal discharge; airway clear    Neck: Supple; no JVD    Respiratory: Decreased BS at bases, No wheezes, rales or rhonchi    Cardiovascular: Regular rate and rhythm. S1 and S2 Normal; No murmurs    Gastrointestinal: Soft non-tender non-distended; Normal bowel sounds    Genitourinary: No  suprapubic  tenderness    Extremities: Normal range of motion, No  edema    Vascular: Peripheral pulses palpable 2+ bilaterally    Neurological: Alert and oriented x3, non focal     Skin: Warm and dry. No acute rash    Musculoskeletal: Normal muscle tone and strength      Psychiatric: Cooperative and appropriate        A/P: 40 year old female with PMH of DM a admitted for:         Acute hypoxic respiratory failure in the setting of viral pneumonia +COVID 19        Respiratory status improving, Off NRB mask, now tolerates NC, will titrate down     C/W Rocephin  IVPB, for possible secondary bacterial PNA    Off Azithro due to plaquenil    C/W Hydroxychloroquine      ON IV solumedrol, will taper down     Supportive care         Uncontrolled  Diabetes with hyperglycemia     BS elevated despite increased LAntus dose , also  fixed  premeal coverage added      Finger stick AC/HS    Increase LAntus to 45U QHS, increase premeal to 10U with ISS    Decrease steroids     HB A1c >10            DVT PPX:  lovenox 40 y.o female  with PMH of  of Type 2 DM  presented to the hospital with  2 weeks of generalized fatigue, body aches, 4 days of dry cough, 2 days of shortness of breath and fever.  She reported SOB with ambulation.     In ED found to be hypoxic, started on O2 NRM; CXR b/l infiltrates, + COVID.    Pt was continued on NRB mask and was started on Plaquenil, Rocephin was added, azithro received 1 dose, was d/c due to Plaquenil use. Pt was also given IV Steroids. Respiratory status slowly improved. Today tolerates RA, ambulated in the room with no SOB and pulse ox remained above 92%. D/c planning and self isolation and precautions discussed.         Vital Signs Last 24 Hrs    T(C): 36.6 (05 Apr 2020 11:16), Max: 36.9 (05 Apr 2020 04:48)    T(F): 97.8 (05 Apr 2020 11:16), Max: 98.5 (05 Apr 2020 04:48)    HR: 66 (05 Apr 2020 11:16) (49 - 66)    BP: 97/59 (05 Apr 2020 11:16) (97/59 - 109/67)    RR: 19 (05 Apr 2020 11:16) (19 - 20)    SpO2: 90% (05 Apr 2020 11:16) (90% - 95%)        PHYSICAL EXAM:    General: Well developed; in no acute distress On NC    Eyes: PERRLA, EOMI; conjunctiva and sclera clear    Head: Normocephalic; atraumatic    ENMT: No nasal discharge; airway clear    Neck: Supple; no JVD    Respiratory: Decreased BS at bases, No wheezes, rales or rhonchi    Cardiovascular: Regular rate and rhythm. S1 and S2 Normal; No murmurs    Gastrointestinal: Soft non-tender non-distended; Normal bowel sounds    Genitourinary: No  suprapubic  tenderness    Extremities: Normal range of motion, No  edema    Vascular: Peripheral pulses palpable 2+ bilaterally    Neurological: Alert and oriented x3, non focal     Skin: Warm and dry. No acute rash    Musculoskeletal: Normal muscle tone and strength      Psychiatric: Cooperative and appropriate        A/P: 40 year old female with PMH of DM a admitted for:         Acute hypoxic respiratory failure in the setting of viral pneumonia +COVID 19        Respiratory status improving, Off NRB mask, tolerates RA    On Rocephin, for possible secondary bacterial PNA, completed 5 days     Off Azithro     Completed   Hydroxychloroquine  5 days     On  IV solumedrol, taper, will complete today     Supportive care         Uncontrolled  Diabetes with hyperglycemia     suspect non compliance     BS elevated likely due to steroids, on lantus and ISS     HB A1c >10    D/c steroids     Start metformin and januvia     Will need to f/u with PCP for further Diabetes management             DVT PPX:  lovenox         Dispo: stable for d/c home today

## 2020-04-05 NOTE — DISCHARGE NOTE PROVIDER - NSDCMRMEDTOKEN_GEN_ALL_CORE_FT
albuterol 90 mcg/inh inhalation aerosol: 2 puff(s) inhaled 4 times a day, As Needed -for bronchospasm   Januvia 50 mg oral tablet: 1 tab(s) orally once a day   metFORMIN 1000 mg oral tablet: 1 tab(s) orally 2 times a day   Mucinex Max Strength 1200 mg oral tablet, extended release: 1 tab(s) orally every 12 hours, As Needed -for cough

## 2024-12-04 NOTE — DISCHARGE NOTE PROVIDER - NSDCQMSTROKE_NEU_ALL_CORE
Name:  Chrystal Rebollar  YOB: 1941   MRN: [id number]      Office Visit: 2024       Assessment & Plan (Medical Decision Making)    Impression: 82 y.o. female seen today as a work-in visit for overall not feeling well, desating with exertion, cough.     1. Shortness of breath  2. Hypoxemia  3. Tachycardia  4. Lower extremity edema  5. COPD exacerbation (HCC)  Likely multifactorial with recent respiratory illness and overall deconditioning. No wheezing today but will cover with a short antibiotic and steroid course given the length of illness. Also concerns for paroxysmal a-fib, not on anticoagulation-- sight possibility of PE. Check D.dimer today. Will call patient/daughter with results. If this result is concerning, will recommend she be evaluated in her local ER close to her home. EKG showing sinus tach with occasional PVC. Patient agreeable to see cardiology just for evaluation of heart rhythm and mild edema. .     - D-Dimer, Quantitative; Future  - EKG 12 Lead  - [facility] [place]  - azithromycin (ZITHROMAX) 250 MG tablet; 500mg on day 1 followed by 250mg on days 2 - 5  Dispense: 6 tablet; Refill: 0  - predniSONE (DELTASONE) 20 MG tablet; Take 1 tablet by mouth daily for 5 days  Dispense: 5 tablet; Refill: 0      Orders Placed This Encounter   Medications    azithromycin (ZITHROMAX) 250 MG tablet     Simg on day 1 followed by 250mg on days 2 - 5     Dispense:  6 tablet     Refill:  0    predniSONE (DELTASONE) 20 MG tablet     Sig: Take 1 tablet by mouth daily for 5 days     Dispense:  5 tablet     Refill:  0     No orders of the defined types were placed in this encounter.    ANT Crawford - NP    Dme: Coleen  No problem-specific Assessment & Plan notes found for this encounter.     Collaborating physician is Michael Mckinney MD    __________________________________________________  HISTORY OF PRESENT ILLNESS:    Ms. Chrystal Rebollar is a 82 y.o. female who is seen  No